# Patient Record
Sex: MALE | Race: BLACK OR AFRICAN AMERICAN | NOT HISPANIC OR LATINO | URBAN - METROPOLITAN AREA
[De-identification: names, ages, dates, MRNs, and addresses within clinical notes are randomized per-mention and may not be internally consistent; named-entity substitution may affect disease eponyms.]

---

## 2017-09-10 ENCOUNTER — EMERGENCY (EMERGENCY)
Facility: HOSPITAL | Age: 37
LOS: 1 days | Discharge: HOME | End: 2017-09-10
Attending: EMERGENCY MEDICINE
Payer: COMMERCIAL

## 2017-09-10 VITALS
TEMPERATURE: 98 F | SYSTOLIC BLOOD PRESSURE: 143 MMHG | RESPIRATION RATE: 18 BRPM | WEIGHT: 130.07 LBS | OXYGEN SATURATION: 100 % | HEIGHT: 67 IN | HEART RATE: 82 BPM | DIASTOLIC BLOOD PRESSURE: 83 MMHG

## 2017-09-10 PROCEDURE — 99284 EMERGENCY DEPT VISIT MOD MDM: CPT

## 2017-09-10 PROCEDURE — 72125 CT NECK SPINE W/O DYE: CPT

## 2017-09-10 PROCEDURE — 99284 EMERGENCY DEPT VISIT MOD MDM: CPT | Mod: 25

## 2017-09-10 PROCEDURE — 72110 X-RAY EXAM L-2 SPINE 4/>VWS: CPT | Mod: 26

## 2017-09-10 PROCEDURE — 72110 X-RAY EXAM L-2 SPINE 4/>VWS: CPT

## 2017-09-10 PROCEDURE — 72125 CT NECK SPINE W/O DYE: CPT | Mod: 26

## 2017-09-10 RX ORDER — IBUPROFEN 200 MG
600 TABLET ORAL ONCE
Qty: 0 | Refills: 0 | Status: COMPLETED | OUTPATIENT
Start: 2017-09-10 | End: 2017-09-10

## 2017-09-10 RX ORDER — IBUPROFEN 200 MG
1 TABLET ORAL
Qty: 28 | Refills: 0 | OUTPATIENT
Start: 2017-09-10 | End: 2017-09-17

## 2017-09-10 RX ADMIN — Medication 600 MILLIGRAM(S): at 16:12

## 2017-09-10 RX ADMIN — Medication 600 MILLIGRAM(S): at 15:35
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
## 2017-09-10 NOTE — ED STATDOCS - MUSCULOSKELETAL, MLM
Lower back TTP. Mild tenderness over the cervical spine. FROM. No bony deformity. Able to stand and ambulate.
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
## 2018-03-15 ENCOUNTER — APPOINTMENT (OUTPATIENT)
Dept: UROLOGY | Facility: CLINIC | Age: 38
End: 2018-03-15
Payer: MEDICAID

## 2018-03-15 VITALS
BODY MASS INDEX: 19.7 KG/M2 | DIASTOLIC BLOOD PRESSURE: 71 MMHG | HEIGHT: 68 IN | OXYGEN SATURATION: 100 % | SYSTOLIC BLOOD PRESSURE: 112 MMHG | TEMPERATURE: 98.1 F | HEART RATE: 69 BPM | WEIGHT: 130 LBS

## 2018-03-15 DIAGNOSIS — R33.9 RETENTION OF URINE, UNSPECIFIED: ICD-10-CM

## 2018-03-15 LAB
BILIRUB UR QL STRIP: NORMAL
CLARITY UR: CLEAR
COLLECTION METHOD: NORMAL
GLUCOSE UR-MCNC: NORMAL
HCG UR QL: 0.2 EU/DL
HGB UR QL STRIP.AUTO: NORMAL
KETONES UR-MCNC: NORMAL
LEUKOCYTE ESTERASE UR QL STRIP: NORMAL
NITRITE UR QL STRIP: NORMAL
PH UR STRIP: 6.5
PROT UR STRIP-MCNC: NORMAL
SP GR UR STRIP: 1.02

## 2018-03-15 PROCEDURE — 99204 OFFICE O/P NEW MOD 45 MIN: CPT

## 2018-03-15 PROCEDURE — 51798 US URINE CAPACITY MEASURE: CPT

## 2018-03-15 PROCEDURE — 81003 URINALYSIS AUTO W/O SCOPE: CPT | Mod: QW

## 2018-03-16 LAB
APPEARANCE: CLEAR
BACTERIA: NEGATIVE
BILIRUBIN URINE: NEGATIVE
BLOOD URINE: NEGATIVE
COLOR: YELLOW
GLUCOSE QUALITATIVE U: NEGATIVE MG/DL
KETONES URINE: NEGATIVE
LEUKOCYTE ESTERASE URINE: NEGATIVE
MICROSCOPIC-UA: NORMAL
NITRITE URINE: NEGATIVE
PH URINE: 6
PROTEIN URINE: NEGATIVE MG/DL
RED BLOOD CELLS URINE: 4 /HPF
SPECIFIC GRAVITY URINE: 1.02
SQUAMOUS EPITHELIAL CELLS: 0 /HPF
UROBILINOGEN URINE: NEGATIVE MG/DL
WHITE BLOOD CELLS URINE: 0 /HPF

## 2018-03-19 LAB
BACTERIA UR CULT: NORMAL
CORE LAB FLUID CYTOLOGY: NORMAL

## 2018-03-20 ENCOUNTER — MOBILE ON CALL (OUTPATIENT)
Age: 38
End: 2018-03-20

## 2018-04-19 ENCOUNTER — OUTPATIENT (OUTPATIENT)
Dept: OUTPATIENT SERVICES | Facility: HOSPITAL | Age: 38
LOS: 1 days | Discharge: HOME | End: 2018-04-19
Payer: MEDICAID

## 2018-04-19 VITALS
HEIGHT: 68 IN | RESPIRATION RATE: 16 BRPM | SYSTOLIC BLOOD PRESSURE: 121 MMHG | TEMPERATURE: 98 F | DIASTOLIC BLOOD PRESSURE: 89 MMHG | WEIGHT: 117.29 LBS | HEART RATE: 82 BPM | OXYGEN SATURATION: 100 %

## 2018-04-19 DIAGNOSIS — N47.1 PHIMOSIS: ICD-10-CM

## 2018-04-19 DIAGNOSIS — R33.9 RETENTION OF URINE, UNSPECIFIED: ICD-10-CM

## 2018-04-19 DIAGNOSIS — Z98.890 OTHER SPECIFIED POSTPROCEDURAL STATES: Chronic | ICD-10-CM

## 2018-04-19 LAB
ANION GAP SERPL CALC-SCNC: 6 MMOL/L — SIGNIFICANT CHANGE UP (ref 5–17)
APPEARANCE UR: CLEAR — SIGNIFICANT CHANGE UP
APTT BLD: 35.8 SEC — SIGNIFICANT CHANGE UP (ref 27.5–37.4)
BACTERIA # UR AUTO: (no result)
BASOPHILS # BLD AUTO: 0.05 K/UL — SIGNIFICANT CHANGE UP (ref 0–0.2)
BASOPHILS NFR BLD AUTO: 0.6 % — SIGNIFICANT CHANGE UP (ref 0–2)
BILIRUB UR-MCNC: NEGATIVE — SIGNIFICANT CHANGE UP
BLD GP AB SCN SERPL QL: SIGNIFICANT CHANGE UP
BUN SERPL-MCNC: 9 MG/DL — SIGNIFICANT CHANGE UP (ref 7–23)
CALCIUM SERPL-MCNC: 9.6 MG/DL — SIGNIFICANT CHANGE UP (ref 8.5–10.1)
CHLORIDE SERPL-SCNC: 105 MMOL/L — SIGNIFICANT CHANGE UP (ref 96–108)
CO2 SERPL-SCNC: 31 MMOL/L — SIGNIFICANT CHANGE UP (ref 22–31)
COLOR SPEC: YELLOW — SIGNIFICANT CHANGE UP
COMMENT - URINE: SIGNIFICANT CHANGE UP
CREAT SERPL-MCNC: 1.19 MG/DL — SIGNIFICANT CHANGE UP (ref 0.5–1.3)
DIFF PNL FLD: NEGATIVE — SIGNIFICANT CHANGE UP
EOSINOPHIL # BLD AUTO: 0.07 K/UL — SIGNIFICANT CHANGE UP (ref 0–0.5)
EOSINOPHIL NFR BLD AUTO: 0.8 % — SIGNIFICANT CHANGE UP (ref 0–6)
EPI CELLS # UR: SIGNIFICANT CHANGE UP
GLUCOSE SERPL-MCNC: 84 MG/DL — SIGNIFICANT CHANGE UP (ref 70–99)
GLUCOSE UR QL: NEGATIVE MG/DL — SIGNIFICANT CHANGE UP
HCT VFR BLD CALC: 42.3 % — SIGNIFICANT CHANGE UP (ref 39–50)
HGB BLD-MCNC: 14.3 G/DL — SIGNIFICANT CHANGE UP (ref 13–17)
IMM GRANULOCYTES NFR BLD AUTO: 0.3 % — SIGNIFICANT CHANGE UP (ref 0–1.5)
INR BLD: 1.08 RATIO — SIGNIFICANT CHANGE UP (ref 0.88–1.16)
KETONES UR-MCNC: NEGATIVE — SIGNIFICANT CHANGE UP
LEUKOCYTE ESTERASE UR-ACNC: (no result)
LYMPHOCYTES # BLD AUTO: 2.99 K/UL — SIGNIFICANT CHANGE UP (ref 1–3.3)
LYMPHOCYTES # BLD AUTO: 34.4 % — SIGNIFICANT CHANGE UP (ref 13–44)
MCHC RBC-ENTMCNC: 32.6 PG — SIGNIFICANT CHANGE UP (ref 27–34)
MCHC RBC-ENTMCNC: 33.8 GM/DL — SIGNIFICANT CHANGE UP (ref 32–36)
MCV RBC AUTO: 96.4 FL — SIGNIFICANT CHANGE UP (ref 80–100)
MONOCYTES # BLD AUTO: 0.64 K/UL — SIGNIFICANT CHANGE UP (ref 0–0.9)
MONOCYTES NFR BLD AUTO: 7.4 % — SIGNIFICANT CHANGE UP (ref 2–14)
NEUTROPHILS # BLD AUTO: 4.92 K/UL — SIGNIFICANT CHANGE UP (ref 1.8–7.4)
NEUTROPHILS NFR BLD AUTO: 56.5 % — SIGNIFICANT CHANGE UP (ref 43–77)
NITRITE UR-MCNC: NEGATIVE — SIGNIFICANT CHANGE UP
NRBC # BLD: 0 /100 WBCS — SIGNIFICANT CHANGE UP (ref 0–0)
PH UR: 6.5 — SIGNIFICANT CHANGE UP (ref 5–8)
PLATELET # BLD AUTO: 289 K/UL — SIGNIFICANT CHANGE UP (ref 150–400)
POTASSIUM SERPL-MCNC: 3.7 MMOL/L — SIGNIFICANT CHANGE UP (ref 3.5–5.3)
POTASSIUM SERPL-SCNC: 3.7 MMOL/L — SIGNIFICANT CHANGE UP (ref 3.5–5.3)
PROT UR-MCNC: 15 MG/DL
PROTHROM AB SERPL-ACNC: 11.7 SEC — SIGNIFICANT CHANGE UP (ref 9.8–12.7)
RBC # BLD: 4.39 M/UL — SIGNIFICANT CHANGE UP (ref 4.2–5.8)
RBC # FLD: 11.8 % — SIGNIFICANT CHANGE UP (ref 10.3–14.5)
RBC CASTS # UR COMP ASSIST: SIGNIFICANT CHANGE UP /HPF (ref 0–4)
SODIUM SERPL-SCNC: 142 MMOL/L — SIGNIFICANT CHANGE UP (ref 135–145)
SP GR SPEC: 1.02 — SIGNIFICANT CHANGE UP (ref 1.01–1.02)
TYPE + AB SCN PNL BLD: SIGNIFICANT CHANGE UP
UROBILINOGEN FLD QL: 1 MG/DL
WBC # BLD: 8.7 K/UL — SIGNIFICANT CHANGE UP (ref 3.8–10.5)
WBC # FLD AUTO: 8.7 K/UL — SIGNIFICANT CHANGE UP (ref 3.8–10.5)
WBC UR QL: SIGNIFICANT CHANGE UP

## 2018-04-19 PROCEDURE — 93010 ELECTROCARDIOGRAM REPORT: CPT

## 2018-04-19 NOTE — H&P PST ADULT - HISTORY OF PRESENT ILLNESS
This is a 35 y/o male with hx of asthma who reports increased frequency and urgency that has gotten progressively worse. He deny any recent UTIs or fevers or CVA tenderness. He is scheduled for  a cursions and cystoscopy. This is a 35 y/o male with hx of asthma who reports increased frequency and urgency that has gotten progressively worse. He deny any recent UTIs, fevers, hematuria or CVA tenderness. He is scheduled for a circumcision and cystoscopy.

## 2018-04-19 NOTE — H&P PST ADULT - ASSESSMENT
This is a 35 y/o male with a diagnosis of phimosis who is scheduled for a circumcision and cystoscopy    Patient instructed on     1. NPO post midnight of surgery  2. Aware that he needs medical clearance with Dr. Cummings  3. Instructed to use his nebulizer and that he may take Loratadine with a sip of water on morning of procedure

## 2018-04-20 LAB
CULTURE RESULTS: NO GROWTH — SIGNIFICANT CHANGE UP
SPECIMEN SOURCE: SIGNIFICANT CHANGE UP

## 2018-04-23 RX ORDER — SODIUM CHLORIDE 9 MG/ML
1000 INJECTION, SOLUTION INTRAVENOUS
Qty: 0 | Refills: 0 | Status: DISCONTINUED | OUTPATIENT
Start: 2018-04-24 | End: 2018-04-24

## 2018-04-23 RX ORDER — OXYCODONE HYDROCHLORIDE 5 MG/1
10 TABLET ORAL EVERY 6 HOURS
Qty: 0 | Refills: 0 | Status: DISCONTINUED | OUTPATIENT
Start: 2018-04-24 | End: 2018-04-24

## 2018-04-23 RX ORDER — FENTANYL CITRATE 50 UG/ML
50 INJECTION INTRAVENOUS
Qty: 0 | Refills: 0 | Status: DISCONTINUED | OUTPATIENT
Start: 2018-04-24 | End: 2018-04-24

## 2018-04-24 ENCOUNTER — RESULT REVIEW (OUTPATIENT)
Age: 38
End: 2018-04-24

## 2018-04-24 ENCOUNTER — OUTPATIENT (OUTPATIENT)
Dept: OUTPATIENT SERVICES | Facility: HOSPITAL | Age: 38
LOS: 1 days | Discharge: HOME | End: 2018-04-24
Payer: MEDICAID

## 2018-04-24 ENCOUNTER — APPOINTMENT (OUTPATIENT)
Dept: UROLOGY | Facility: HOSPITAL | Age: 38
End: 2018-04-24

## 2018-04-24 VITALS
OXYGEN SATURATION: 100 % | SYSTOLIC BLOOD PRESSURE: 141 MMHG | TEMPERATURE: 98 F | DIASTOLIC BLOOD PRESSURE: 85 MMHG | HEART RATE: 82 BPM | RESPIRATION RATE: 16 BRPM

## 2018-04-24 VITALS
WEIGHT: 117.95 LBS | HEART RATE: 81 BPM | RESPIRATION RATE: 16 BRPM | SYSTOLIC BLOOD PRESSURE: 135 MMHG | DIASTOLIC BLOOD PRESSURE: 89 MMHG | OXYGEN SATURATION: 99 % | TEMPERATURE: 98 F | HEIGHT: 68 IN

## 2018-04-24 DIAGNOSIS — Z98.890 OTHER SPECIFIED POSTPROCEDURAL STATES: Chronic | ICD-10-CM

## 2018-04-24 PROCEDURE — 88304 TISSUE EXAM BY PATHOLOGIST: CPT | Mod: 26

## 2018-04-24 PROCEDURE — 52000 CYSTOURETHROSCOPY: CPT

## 2018-04-24 RX ORDER — MORPHINE SULFATE 50 MG/1
4 CAPSULE, EXTENDED RELEASE ORAL EVERY 4 HOURS
Qty: 0 | Refills: 0 | Status: DISCONTINUED | OUTPATIENT
Start: 2018-04-24 | End: 2018-04-24

## 2018-04-24 RX ORDER — HYDROCODONE BITARTRATE AND ACETAMINOPHEN 7.5; 325 MG/15ML; MG/15ML
1 SOLUTION ORAL
Qty: 15 | Refills: 0
Start: 2018-04-24 | End: 2018-04-28

## 2018-04-24 RX ORDER — ONDANSETRON 8 MG/1
4 TABLET, FILM COATED ORAL ONCE
Qty: 0 | Refills: 0 | Status: DISCONTINUED | OUTPATIENT
Start: 2018-04-24 | End: 2018-04-24

## 2018-04-24 RX ADMIN — OXYCODONE HYDROCHLORIDE 10 MILLIGRAM(S): 5 TABLET ORAL at 13:12

## 2018-04-24 RX ADMIN — SODIUM CHLORIDE 75 MILLILITER(S): 9 INJECTION, SOLUTION INTRAVENOUS at 12:58

## 2018-04-24 RX ADMIN — OXYCODONE HYDROCHLORIDE 10 MILLIGRAM(S): 5 TABLET ORAL at 13:26

## 2018-04-24 RX ADMIN — FENTANYL CITRATE 50 MICROGRAM(S): 50 INJECTION INTRAVENOUS at 13:08

## 2018-04-24 RX ADMIN — FENTANYL CITRATE 50 MICROGRAM(S): 50 INJECTION INTRAVENOUS at 13:01

## 2018-04-24 NOTE — ASU DISCHARGE PLAN (ADULT/PEDIATRIC). - MEDICATION SUMMARY - MEDICATIONS TO TAKE
I will START or STAY ON the medications listed below when I get home from the hospital:    Vicodin 5 mg-300 mg oral tablet  -- 1 tab(s) by mouth every 6 hours MDD:4 tabs  -- Caution federal law prohibits the transfer of this drug to any person other  than the person for whom it was prescribed.  Do not drink alcoholic beverages when taking this medication.  May cause drowsiness.  Alcohol may intensify this effect.  Use care when operating dangerous machinery.  This drug may impair the ability to drive or operate machinery.  Use care until you become familiar with its effects.  This product contains acetaminophen.  Do not use  with any other product containing acetaminophen to prevent possible liver damage.  Using more of this medication than prescribed may cause serious breathing problems.    -- Indication: For s/p Circumcision    loratadine 10 mg oral tablet  -- 1 tab(s) by mouth once a day  -- Indication: For Home med    ipratropium-albuterol 0.5 mg-2.5 mg/3 mLinhalation solution  -- 3 milliliter(s) inhaled once a day  -- Indication: For Home med    Ventolin HFA 90 mcg/inh inhalation aerosol  -- 1 puff(s) inhaled   -- For inhalation only.  It is very important that you take or use this exactly as directed.  Do not skip doses or discontinue unless directed by your doctor.  Obtain medical advice before taking any non-prescription drugs as some may affect the action of this medication.  Shake well before use.      -- Indication: For Home med    Singulair 10 mg oral tablet  -- 1 tab(s) by mouth once a day (in the evening)  -- Indication: For Home med    fluticasone 50 mcg/inh nasal spray  -- 1 spray(s) into nose once a day  -- Indication: For Home med    Multiple Vitamins oral tablet  -- 1 tab(s) by mouth once a day  -- Indication: For Home med

## 2018-04-24 NOTE — BRIEF OPERATIVE NOTE - PROCEDURE
<<-----Click on this checkbox to enter Procedure Circumcision male  04/24/2018    Active  HAYLEY  Cystoscopy in adult  04/24/2018    Active  HAYLEY

## 2018-04-26 LAB — SURGICAL PATHOLOGY FINAL REPORT - CH: SIGNIFICANT CHANGE UP

## 2018-04-27 DIAGNOSIS — R31.21 ASYMPTOMATIC MICROSCOPIC HEMATURIA: ICD-10-CM

## 2018-04-27 DIAGNOSIS — Z82.49 FAMILY HISTORY OF ISCHEMIC HEART DISEASE AND OTHER DISEASES OF THE CIRCULATORY SYSTEM: ICD-10-CM

## 2018-04-27 DIAGNOSIS — F17.210 NICOTINE DEPENDENCE, CIGARETTES, UNCOMPLICATED: ICD-10-CM

## 2018-04-27 DIAGNOSIS — F12.29 CANNABIS DEPENDENCE WITH UNSPECIFIED CANNABIS-INDUCED DISORDER: ICD-10-CM

## 2018-04-27 DIAGNOSIS — N47.1 PHIMOSIS: ICD-10-CM

## 2018-04-27 DIAGNOSIS — G89.29 OTHER CHRONIC PAIN: ICD-10-CM

## 2018-04-27 DIAGNOSIS — J45.909 UNSPECIFIED ASTHMA, UNCOMPLICATED: ICD-10-CM

## 2018-04-27 DIAGNOSIS — M54.9 DORSALGIA, UNSPECIFIED: ICD-10-CM

## 2018-05-10 ENCOUNTER — APPOINTMENT (OUTPATIENT)
Dept: UROLOGY | Facility: CLINIC | Age: 38
End: 2018-05-10
Payer: MEDICAID

## 2018-05-10 VITALS
DIASTOLIC BLOOD PRESSURE: 81 MMHG | HEART RATE: 66 BPM | TEMPERATURE: 98.5 F | SYSTOLIC BLOOD PRESSURE: 115 MMHG | BODY MASS INDEX: 19.7 KG/M2 | WEIGHT: 130 LBS | HEIGHT: 68 IN

## 2018-05-10 DIAGNOSIS — N47.1 PHIMOSIS: ICD-10-CM

## 2018-05-10 DIAGNOSIS — R31.29 OTHER MICROSCOPIC HEMATURIA: ICD-10-CM

## 2018-05-10 PROCEDURE — 99213 OFFICE O/P EST LOW 20 MIN: CPT

## 2018-05-11 ENCOUNTER — MESSAGE (OUTPATIENT)
Age: 38
End: 2018-05-11

## 2018-05-14 ENCOUNTER — APPOINTMENT (OUTPATIENT)
Dept: UROLOGY | Facility: CLINIC | Age: 38
End: 2018-05-14

## 2019-06-09 PROBLEM — N47.1 PHIMOSIS: Status: ACTIVE | Noted: 2018-03-15

## 2021-05-09 NOTE — ASU DISCHARGE PLAN (ADULT/PEDIATRIC). - ACCOMPANYING ADULT'S SIGNATURE_______________________________________
CONST: no fevers, no chills, no trauma  EYES: no pain, no blurry vision   ENT: no sore throat, no epistaxis, no rhinorrhea  CV: + chest pain, no palpitations, no orthopnea, no extremity pain or swelling  RESP: + shortness of breath, + cough, no sputum, + pleurisy, no wheezing  ABD: no abdominal pain, no nausea, no vomiting, no diarrhea, no black or bloody stool  : no dysuria, no hematuria, no frequency, no urgency  MSK: no back pain, no neck pain, no extremity pain  NEURO: no headache, no sensory disturbances, no focal weakness, no dizziness  HEME: no easy bleeding or bruising  SKIN: no diaphoresis, no rash Statement Selected

## 2021-09-24 PROBLEM — N47.8 OTHER DISORDERS OF PREPUCE: Chronic | Status: ACTIVE | Noted: 2018-04-19

## 2021-09-24 PROBLEM — R35.0 FREQUENCY OF MICTURITION: Chronic | Status: ACTIVE | Noted: 2018-04-19

## 2021-09-24 PROBLEM — R39.15 URGENCY OF URINATION: Chronic | Status: ACTIVE | Noted: 2018-04-19

## 2021-09-24 PROBLEM — G43.909 MIGRAINE, UNSPECIFIED, NOT INTRACTABLE, WITHOUT STATUS MIGRAINOSUS: Chronic | Status: ACTIVE | Noted: 2018-04-19

## 2021-11-23 ENCOUNTER — APPOINTMENT (OUTPATIENT)
Dept: NEUROSURGERY | Facility: CLINIC | Age: 41
End: 2021-11-23
Payer: MEDICAID

## 2021-11-23 VITALS
HEIGHT: 68 IN | DIASTOLIC BLOOD PRESSURE: 81 MMHG | WEIGHT: 126 LBS | BODY MASS INDEX: 19.1 KG/M2 | TEMPERATURE: 96.8 F | SYSTOLIC BLOOD PRESSURE: 147 MMHG | HEART RATE: 70 BPM | OXYGEN SATURATION: 100 %

## 2021-11-23 PROCEDURE — 99203 OFFICE O/P NEW LOW 30 MIN: CPT
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
## 2022-03-02 ENCOUNTER — RESULT REVIEW (OUTPATIENT)
Age: 42
End: 2022-03-02

## 2022-03-02 ENCOUNTER — NON-APPOINTMENT (OUTPATIENT)
Age: 42
End: 2022-03-02

## 2022-03-02 ENCOUNTER — APPOINTMENT (OUTPATIENT)
Dept: RHEUMATOLOGY | Facility: CLINIC | Age: 42
End: 2022-03-02
Payer: MEDICAID

## 2022-03-02 VITALS
WEIGHT: 130 LBS | OXYGEN SATURATION: 98 % | BODY MASS INDEX: 19.7 KG/M2 | SYSTOLIC BLOOD PRESSURE: 120 MMHG | TEMPERATURE: 97.7 F | DIASTOLIC BLOOD PRESSURE: 70 MMHG | HEART RATE: 74 BPM | HEIGHT: 68 IN

## 2022-03-02 DIAGNOSIS — M54.50 LOW BACK PAIN, UNSPECIFIED: ICD-10-CM

## 2022-03-02 DIAGNOSIS — M25.512 PAIN IN LEFT SHOULDER: ICD-10-CM

## 2022-03-02 DIAGNOSIS — M25.569 PAIN IN UNSPECIFIED KNEE: ICD-10-CM

## 2022-03-02 DIAGNOSIS — M79.675 PAIN IN RIGHT TOE(S): ICD-10-CM

## 2022-03-02 DIAGNOSIS — Z87.09 PERSONAL HISTORY OF OTHER DISEASES OF THE RESPIRATORY SYSTEM: ICD-10-CM

## 2022-03-02 DIAGNOSIS — R20.0 ANESTHESIA OF SKIN: ICD-10-CM

## 2022-03-02 DIAGNOSIS — R20.2 ANESTHESIA OF SKIN: ICD-10-CM

## 2022-03-02 DIAGNOSIS — Z78.9 OTHER SPECIFIED HEALTH STATUS: ICD-10-CM

## 2022-03-02 DIAGNOSIS — M79.674 PAIN IN RIGHT TOE(S): ICD-10-CM

## 2022-03-02 DIAGNOSIS — Z82.49 FAMILY HISTORY OF ISCHEMIC HEART DISEASE AND OTHER DISEASES OF THE CIRCULATORY SYSTEM: ICD-10-CM

## 2022-03-02 DIAGNOSIS — M25.50 PAIN IN UNSPECIFIED JOINT: ICD-10-CM

## 2022-03-02 DIAGNOSIS — M25.579 PAIN IN UNSPECIFIED ANKLE AND JOINTS OF UNSPECIFIED FOOT: ICD-10-CM

## 2022-03-02 DIAGNOSIS — M25.531 PAIN IN RIGHT WRIST: ICD-10-CM

## 2022-03-02 DIAGNOSIS — M19.049 PRIMARY OSTEOARTHRITIS, UNSPECIFIED HAND: ICD-10-CM

## 2022-03-02 PROCEDURE — 99205 OFFICE O/P NEW HI 60 MIN: CPT

## 2022-03-02 RX ORDER — GABAPENTIN 600 MG/1
600 TABLET, COATED ORAL
Refills: 0 | Status: ACTIVE | COMMUNITY

## 2022-03-02 NOTE — HISTORY OF PRESENT ILLNESS
[FreeTextEntry1] : 41-year-old AA male, here for the first time w/ hx of anxiety/depression; chronic pain syndrome w/ pain management, Dr. Sawant reports of joint aches and achy pain all over.\par Patient states he's been noticing diffuse pain all over including joint aches for the past 2-3 years.\par Patient states he notices some pain at the base of his right thumb.\par States he notices some soreness in his right wrist without effusion.\par States he notices some soreness in his bilateral elbows intermittently.\par States he notices soreness in his left shoulder with rotation with good range of motion.\par Has full ROM in b/l shoulders, no pelvic/girdle stiffness and able to stand up without using her hands, no temporal pain/unremitting headaches, no vision changes, no jaw pain.\par States he notices neck pain with rotation without paresthesia and does get nerve block as well as cortisone injections to his neck and back with pain management.\par States he is on gabapentin 600 t.i.d. 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 Denies any loss of bladder or bowel incontinence or saddle anesthesias.\par States he notes left more than right groin/hip pain. \par States he notes left more than right knee pain with going up and down the stairs and denies any crystal arthropathy like attacks.\par States he notes his left more than right ankle pain.\par States he noticed soreness in his bilateral toes.\par States he notices intermittent numbness/tingling in his hands and feet. States he thinks the numbness/tingling goes into the fifth digit of the hand so unlikely carpal tunnel.\par Denies any fever/chills, no rashes, no ulcers, no dry eyes, no dry mouth, no raynaud's, no infectious diarrhea or  symptoms at this time.\par \par
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
## 2022-03-02 NOTE — PHYSICAL EXAM
[General Appearance - Alert] : alert [General Appearance - In No Acute Distress] : in no acute distress [Sclera] : the sclera and conjunctiva were normal [Extraocular Movements] : extraocular movements were intact [Outer Ear] : the ears and nose were normal in appearance [Neck Appearance] : the appearance of the neck was normal [Respiration, Rhythm And Depth] : normal respiratory rhythm and effort [Heart Rate And Rhythm] : heart rate was normal and rhythm regular [Heart Sounds] : normal S1 and S2 [Abdomen Soft] : soft [Abdomen Tenderness] : non-tender [Cervical Lymph Nodes Enlarged Anterior Bilaterally] : anterior cervical [Supraclavicular Lymph Nodes Enlarged Bilaterally] : supraclavicular [No CVA Tenderness] : no ~M costovertebral angle tenderness [Motor Tone] : muscle strength and tone were normal [] : no rash [No Focal Deficits] : no focal deficits [Impaired Insight] : insight and judgment were intact [Mood] : the mood was normal [FreeTextEntry1] : Rt CMC tenderness; Rt wrist tenderness w/o effusion; no synovitis or effusion on exam noted today; good ROM in b/l shoulders w/ Lt shoulder tenderness on rotation; Lt>Rt hip/groin tenderness; + multiple tender points incld sternum; upper/lower back;elbows;hips; medial knee,etc

## 2022-03-11 ENCOUNTER — LABORATORY RESULT (OUTPATIENT)
Age: 42
End: 2022-03-11

## 2022-03-12 LAB
25(OH)D3 SERPL-MCNC: 26 NG/ML
ALBUMIN SERPL ELPH-MCNC: 5 G/DL
ALP BLD-CCNC: 67 U/L
ALT SERPL-CCNC: 21 U/L
ANION GAP SERPL CALC-SCNC: 14 MMOL/L
AST SERPL-CCNC: 24 U/L
BASOPHILS # BLD AUTO: 0.03 K/UL
BASOPHILS NFR BLD AUTO: 0.5 %
BILIRUB SERPL-MCNC: 0.5 MG/DL
BUN SERPL-MCNC: 12 MG/DL
CALCIUM SERPL-MCNC: 9.6 MG/DL
CHLORIDE SERPL-SCNC: 102 MMOL/L
CK SERPL-CCNC: 153 U/L
CO2 SERPL-SCNC: 25 MMOL/L
CREAT SERPL-MCNC: 1.3 MG/DL
CRP SERPL-MCNC: <3 MG/L
EGFR: 70 ML/MIN/1.73M2
EOSINOPHIL # BLD AUTO: 0.08 K/UL
EOSINOPHIL NFR BLD AUTO: 1.2 %
ERYTHROCYTE [SEDIMENTATION RATE] IN BLOOD BY WESTERGREN METHOD: 5 MM/HR
ESTIMATED AVERAGE GLUCOSE: 97 MG/DL
FOLATE SERPL-MCNC: >20 NG/ML
GLUCOSE SERPL-MCNC: 78 MG/DL
HAV IGM SER QL: NONREACTIVE
HBA1C MFR BLD HPLC: 5 %
HBV CORE IGM SER QL: NONREACTIVE
HBV SURFACE AG SER QL: NONREACTIVE
HCT VFR BLD CALC: 45.2 %
HCV AB SER QL: NONREACTIVE
HCV S/CO RATIO: 0.08 S/CO
HGB BLD-MCNC: 14.4 G/DL
IMM GRANULOCYTES NFR BLD AUTO: 0.3 %
LYMPHOCYTES # BLD AUTO: 2.13 K/UL
LYMPHOCYTES NFR BLD AUTO: 32.7 %
MAN DIFF?: NORMAL
MCHC RBC-ENTMCNC: 31.2 PG
MCHC RBC-ENTMCNC: 31.9 GM/DL
MCV RBC AUTO: 97.8 FL
MONOCYTES # BLD AUTO: 0.47 K/UL
MONOCYTES NFR BLD AUTO: 7.2 %
NEUTROPHILS # BLD AUTO: 3.79 K/UL
NEUTROPHILS NFR BLD AUTO: 58.1 %
PLATELET # BLD AUTO: 298 K/UL
POTASSIUM SERPL-SCNC: 4.2 MMOL/L
PROT SERPL-MCNC: 7.4 G/DL
RBC # BLD: 4.62 M/UL
RBC # FLD: 11.8 %
RHEUMATOID FACT SER QL: <10 IU/ML
SODIUM SERPL-SCNC: 141 MMOL/L
TSH SERPL-ACNC: 0.73 UIU/ML
VIT B12 SERPL-MCNC: 1200 PG/ML
WBC # FLD AUTO: 6.52 K/UL

## 2022-03-14 LAB
ACE BLD-CCNC: 45 U/L
CCP AB SER IA-ACNC: <8 UNITS
ENA RNP AB SER IA-ACNC: 0.2 AL
ENA SM AB SER IA-ACNC: <0.2 AL
ENA SS-A AB SER IA-ACNC: <0.2 AL
ENA SS-B AB SER IA-ACNC: <0.2 AL
RF+CCP IGG SER-IMP: NEGATIVE

## 2022-03-16 LAB
ANA SER IF-ACNC: NEGATIVE
B19V IGG SER QL IA: 6.85 INDEX
B19V IGG+IGM SER-IMP: NORMAL
B19V IGG+IGM SER-IMP: POSITIVE
B19V IGM FLD-ACNC: 0.11 INDEX
B19V IGM SER-ACNC: NEGATIVE
G6PD SER-CCNC: 2.3 U/G HGB

## 2022-03-18 ENCOUNTER — APPOINTMENT (OUTPATIENT)
Dept: RADIOLOGY | Facility: CLINIC | Age: 42
End: 2022-03-18
Payer: MEDICAID

## 2022-03-18 ENCOUNTER — OUTPATIENT (OUTPATIENT)
Dept: OUTPATIENT SERVICES | Facility: HOSPITAL | Age: 42
LOS: 1 days | End: 2022-03-18
Payer: MEDICAID

## 2022-03-18 DIAGNOSIS — M25.531 PAIN IN RIGHT WRIST: ICD-10-CM

## 2022-03-18 DIAGNOSIS — Z98.890 OTHER SPECIFIED POSTPROCEDURAL STATES: Chronic | ICD-10-CM

## 2022-03-18 DIAGNOSIS — M19.049 PRIMARY OSTEOARTHRITIS, UNSPECIFIED HAND: ICD-10-CM

## 2022-03-18 DIAGNOSIS — M54.50 LOW BACK PAIN, UNSPECIFIED: ICD-10-CM

## 2022-03-18 DIAGNOSIS — M25.569 PAIN IN UNSPECIFIED KNEE: ICD-10-CM

## 2022-03-18 PROCEDURE — 72100 X-RAY EXAM L-S SPINE 2/3 VWS: CPT | Mod: 26

## 2022-03-18 PROCEDURE — 73100 X-RAY EXAM OF WRIST: CPT | Mod: 26,RT

## 2022-03-18 PROCEDURE — 73030 X-RAY EXAM OF SHOULDER: CPT | Mod: 26,LT

## 2022-03-18 PROCEDURE — 73120 X-RAY EXAM OF HAND: CPT

## 2022-03-18 PROCEDURE — 73120 X-RAY EXAM OF HAND: CPT | Mod: 26,RT

## 2022-03-18 PROCEDURE — 73521 X-RAY EXAM HIPS BI 2 VIEWS: CPT | Mod: 26

## 2022-03-18 PROCEDURE — 71046 X-RAY EXAM CHEST 2 VIEWS: CPT | Mod: 26

## 2022-03-18 PROCEDURE — 73565 X-RAY EXAM OF KNEES: CPT | Mod: 26

## 2022-03-18 PROCEDURE — 73660 X-RAY EXAM OF TOE(S): CPT | Mod: 26,50

## 2022-03-18 PROCEDURE — 73100 X-RAY EXAM OF WRIST: CPT

## 2022-03-18 PROCEDURE — 73521 X-RAY EXAM HIPS BI 2 VIEWS: CPT

## 2022-03-18 PROCEDURE — 72100 X-RAY EXAM L-S SPINE 2/3 VWS: CPT

## 2022-03-18 PROCEDURE — 71046 X-RAY EXAM CHEST 2 VIEWS: CPT

## 2022-03-18 PROCEDURE — 73565 X-RAY EXAM OF KNEES: CPT

## 2022-03-18 PROCEDURE — 73660 X-RAY EXAM OF TOE(S): CPT

## 2022-03-18 PROCEDURE — 73030 X-RAY EXAM OF SHOULDER: CPT

## 2022-03-21 LAB — HLA-B27 RELATED AG QL: NEGATIVE
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
## 2022-06-09 ENCOUNTER — APPOINTMENT (OUTPATIENT)
Dept: RHEUMATOLOGY | Facility: CLINIC | Age: 42
End: 2022-06-09
Payer: MEDICAID

## 2022-06-09 VITALS
WEIGHT: 130 LBS | OXYGEN SATURATION: 99 % | SYSTOLIC BLOOD PRESSURE: 110 MMHG | HEIGHT: 68 IN | BODY MASS INDEX: 19.7 KG/M2 | HEART RATE: 82 BPM | DIASTOLIC BLOOD PRESSURE: 70 MMHG | TEMPERATURE: 97.4 F

## 2022-06-09 DIAGNOSIS — M62.830 MUSCLE SPASM OF BACK: ICD-10-CM

## 2022-06-09 DIAGNOSIS — M79.644 PAIN IN RIGHT FINGER(S): ICD-10-CM

## 2022-06-09 DIAGNOSIS — R52 PAIN, UNSPECIFIED: ICD-10-CM

## 2022-06-09 DIAGNOSIS — E55.9 VITAMIN D DEFICIENCY, UNSPECIFIED: ICD-10-CM

## 2022-06-09 PROCEDURE — 99214 OFFICE O/P EST MOD 30 MIN: CPT | Mod: 25

## 2022-06-09 PROCEDURE — 20600 DRAIN/INJ JOINT/BURSA W/O US: CPT | Mod: RT

## 2022-06-09 RX ORDER — TRIAMCINOLONE ACETONIDE 40 MG/ML
40 SUSPENSION INTRA-ARTERIAL; INTRAMUSCULAR
Qty: 1 | Refills: 0 | Status: COMPLETED | OUTPATIENT
Start: 2022-06-09

## 2022-06-09 RX ADMIN — TRIAMCINOLONE ACETONIDE 0 MG/ML: 40 INJECTION, SUSPENSION INTRA-ARTICULAR; INTRAMUSCULAR at 00:00

## 2022-06-09 NOTE — PHYSICAL EXAM
[General Appearance - Alert] : alert [General Appearance - In No Acute Distress] : in no acute distress [Sclera] : the sclera and conjunctiva were normal [Extraocular Movements] : extraocular movements were intact [Outer Ear] : the ears and nose were normal in appearance [Neck Appearance] : the appearance of the neck was normal [Respiration, Rhythm And Depth] : normal respiratory rhythm and effort [Heart Rate And Rhythm] : heart rate was normal and rhythm regular [Heart Sounds] : normal S1 and S2 [Abdomen Soft] : soft [Abdomen Tenderness] : non-tender [Cervical Lymph Nodes Enlarged Anterior Bilaterally] : anterior cervical [Supraclavicular Lymph Nodes Enlarged Bilaterally] : supraclavicular [No CVA Tenderness] : no ~M costovertebral angle tenderness [Motor Tone] : muscle strength and tone were normal [] : no rash [No Focal Deficits] : no focal deficits [Impaired Insight] : insight and judgment were intact [Mood] : the mood was normal [FreeTextEntry1] : Rt CMC tenderness; No synovitis or effusion on exam noted today; good ROM in b/l shoulders; + multiple tender points incld sternum; upper/lower back;elbows;hips; medial knee,etc

## 2022-06-09 NOTE — HISTORY OF PRESENT ILLNESS
[FreeTextEntry1] : 41-year-old AA male, here for the first follow up w/ hx of anxiety/depression; chronic pain syndrome w/ pain management, Dr. Sawant reports of joint aches and achy pain all over.\par Today he states he did labs and xrays to review in detail. \par Patient states he's been noticing diffuse pain all over including joint aches for the past 2-3 years.\par Patient states he notices some pain at the base of his right thumb.\par States he notices some soreness in his right wrist without effusion.\par States he notices some soreness in his bilateral elbows intermittently.\par States he notices soreness in his left shoulder with rotation with good range of motion.\par Has full ROM in b/l shoulders, no pelvic/girdle stiffness and able to stand up without using her hands, no temporal pain/unremitting headaches, no vision changes, no jaw pain.\par States he notices neck pain with rotation without paresthesia and does get nerve block as well as cortisone injections to his neck and back with pain management.\par States he is on gabapentin 600 t.i.d. 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 Denies any loss of bladder or bowel incontinence or saddle anesthesias.\par States he notes left more than right groin/hip pain. \par States he notes left more than right knee pain with going up and down the stairs and denies any crystal arthropathy like attacks.\par States he notes his left more than right ankle pain.\par States he noticed soreness in his bilateral toes.\par States he notices intermittent numbness/tingling in his hands and feet. States he thinks the numbness/tingling goes into the fifth digit of the hand so unlikely carpal tunnel.\par Denies any fever/chills, no rashes, no ulcers, no dry eyes, no dry mouth, no raynaud's, no infectious diarrhea or  symptoms at this time.\par \par

## 2022-06-09 NOTE — ASSESSMENT
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; Fibromyalgia; and G6PD deficiency on labs.\par -No synovitis or effusion on exam noted today and advised to monitor.\par -reviewed labs 3/11/22 with normal ESR/CRP; serologies all within normal limits at this time\par -Referred xray LS spine 3/18/22 normal; SI normal\par -reviewed xray LT shoulder 3/18/22 slightly narrowed AC joint space\par -reviewed xray b/l feet 3/18/22 feet normal; no erosions\par -reviewed xray knees 3/18/22 normal\par -reviewed xray hips 3/18/22 normal\par -reviewed chest xray 3/18/22 without hilar adenopathy/sarcoid \par  \par Rt CMC pain: reviewed xray Rt hand/wrist 3/18/22 w/ 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\par -offered Kenalog 20 mg injection today w/ r/b/s discussed and patient agreeable.\par -discussed r/b/s of voltaren gel 1% PRN w/ pt agreeable and prescription sent as below \par \par G6PD deficiency:  low G6PD on labs 3/11/22\par -discussed to avoid foods incld galen beans, meds like HCQ or sulfasalazine w/ G6PD def to prevent hemolytic anemia with hand out given \par \par Cervicalgia: tenderness in c-spine w/ rotation w/ good ROM w/o paresthesias \par -Reviewed MRI 7/9/21 w/ disc herniations and disc bulge \par -upper paraspinal tightness/spams: -discussed r/b/s of cyclobenzaprine PRN w/ pt agreeable and prescription sent as below\par -states he gets steroid injection and nerve block to neck and back w/ pain management \par -on gabapentin 600 mg PO TID \par \par Intermittent numbness/tingling: intermittent in hands and feet. States tingling in b/l hands does not spare 5th digit so unlikely CTS.\par -reviewed metabolic labs 3/11/22 all normal incld folate, B12, TSH, HbA1C for it.\par -if persistent can consider EMG w/ Neuro\par \par Fibromyalgia: + tender points present \par -on gabapentin 600mg PO TID w/ pain management, Dr. Sawant\par -discussed adding cymbalta with patient agreeable and thinks perhaps had in the past and he does not recall results on it \par -discussed r/b/s of Cymbalta 20mg daily w/ pt agreeable and prescription sent as below\par -encouraged gentle exercises as tolerated\par -PT referral for medical massage given if covered by insurance \par \par Depression/Anxiety: denies any SI or HI and states he can note these symptoms \par -discussed r/b/s of Cymbalta 20mg daily w/ pt agreeable and prescription sent as below\par \par Chronic pain syndrome: on medical marijuana and Percocet w/ pain management \par \par -educated on symptoms to monitor for in detail and alert us if any concerns.\par -knows to stay up to date on health maintenance w/ PCP\par -f/u in 2-3 mo or sooner as needed \par \par

## 2022-06-09 NOTE — REASON FOR VISIT
[Follow-Up: _____] : a [unfilled] follow-up visit [FreeTextEntry1] : generalized aches/pain; review labs/xrays; Rt thumb pain

## 2022-06-09 NOTE — PROCEDURE
[Today's Date:] : Date: [unfilled] [Risks] : risks [Benefits] : benefits [Alternatives] : alternatives [Consent Obtained] : written consent was obtained prior to the procedure and is detailed in the patient's record [Patient] : Prior to the start of the procedure a time out was taken and the identity of the patient was confirmed via name and date of birth with the patient. The correct site and the procedure to be performed were confirmed. The correct side was confirmed if applicable. The availability of the correct equipment was verified [Therapeutic] : therapeutic [#1 Site: ______] : #1 site identified in the [unfilled] [Ethyl Chloride] : ethyl chloride [Betadine] : betadine solution [Alcohol] : alcohol [25 gauge 5/8  inch] : A 25 gauge 5/8 inch needle was used [___ml 1% Lidocaine] : [unfilled] ml of 1% lidocaine [Tolerated Well] : the patient tolerated the procedure well [No Complications] : there were no complications [de-identified] : Kenalog 20 mg

## 2022-09-14 ENCOUNTER — APPOINTMENT (OUTPATIENT)
Dept: RHEUMATOLOGY | Facility: CLINIC | Age: 42
End: 2022-09-14

## 2022-09-14 VITALS
DIASTOLIC BLOOD PRESSURE: 80 MMHG | TEMPERATURE: 97.4 F | HEIGHT: 68 IN | HEART RATE: 89 BPM | SYSTOLIC BLOOD PRESSURE: 120 MMHG | WEIGHT: 132 LBS | BODY MASS INDEX: 20 KG/M2 | OXYGEN SATURATION: 97 %

## 2022-09-14 DIAGNOSIS — Z23 ENCOUNTER FOR IMMUNIZATION: ICD-10-CM

## 2022-09-14 PROCEDURE — G0008: CPT

## 2022-09-14 PROCEDURE — 90686 IIV4 VACC NO PRSV 0.5 ML IM: CPT

## 2022-09-14 PROCEDURE — 99214 OFFICE O/P EST MOD 30 MIN: CPT | Mod: 25

## 2022-09-14 RX ORDER — ERGOCALCIFEROL 1.25 MG/1
1.25 MG CAPSULE, LIQUID FILLED ORAL
Qty: 4 | Refills: 1 | Status: DISCONTINUED | COMMUNITY
Start: 2022-06-09 | End: 2022-09-14

## 2022-09-14 NOTE — HISTORY OF PRESENT ILLNESS
[FreeTextEntry1] : 41-year-old AA male, here for follow up w/ hx of anxiety/depression; chronic pain syndrome w/ pain management, Dr. 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 Denies any SI or HI.\par States he needs refill on the cymbalta and defers needing to increasing the dose on it at this time.\par Denies any swelling or redness or warmth of any joints at this time.\par Has full ROM in b/l shoulders, no pelvic/girdle stiffness and able to stand up without using her hands, no temporal pain/unremitting headaches, no vision changes, no jaw pain.\par States he notices intermittent neck pain with rotation without paresthesia and does get nerve block as well as cortisone injections to his neck and back with pain management.\par States he is on gabapentin 600 t.i.d. with pain management.\par States he is a medical marijuana with pain management.\par States he notices intermittent numbness/tingling in his hands and feet. States he thinks the numbness/tingling goes into the fifth digit of the hand so unlikely carpal tunnel.\par He has hx of Rt CMC OA and states he would like to see hand specialist for it. \par Denies any fever/chills, no rashes, no ulcers, no dry eyes, no dry mouth, no raynaud's, no infectious diarrhea or  symptoms at this time.\par \par States he would like the flu shot today; denies any egg allergy and reports no reaction to it in the past.\par \par

## 2022-09-14 NOTE — PHYSICAL EXAM
[General Appearance - Alert] : alert [General Appearance - In No Acute Distress] : in no acute distress [Sclera] : the sclera and conjunctiva were normal [Extraocular Movements] : extraocular movements were intact [Outer Ear] : the ears and nose were normal in appearance [Neck Appearance] : the appearance of the neck was normal [Respiration, Rhythm And Depth] : normal respiratory rhythm and effort [Heart Rate And Rhythm] : heart rate was normal and rhythm regular [Heart Sounds] : normal S1 and S2 [Abdomen Soft] : soft [Abdomen Tenderness] : non-tender [Cervical Lymph Nodes Enlarged Anterior Bilaterally] : anterior cervical [Supraclavicular Lymph Nodes Enlarged Bilaterally] : supraclavicular [No CVA Tenderness] : no ~M costovertebral angle tenderness [Motor Tone] : muscle strength and tone were normal [] : no rash [No Focal Deficits] : no focal deficits [Impaired Insight] : insight and judgment were intact [Mood] : the mood was normal [FreeTextEntry1] : No synovitis or effusion on exam noted today; Good ROM in b/l shoulders, no pelvic/girdle stiffness and able to stand up without using his hands; improved tender points

## 2022-09-14 NOTE — ASSESSMENT
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; Fibromyalgia; and G6PD deficiency on labs.\par -No synovitis or effusion on exam noted today and advised to monitor.\par -reviewed labs 3/11/22 with normal ESR/CRP; serologies all within normal limits at this time\par -labs as below to monitor \par -xray LS spine 3/18/22 normal; SI normal\par -xray LT shoulder 3/18/22 slightly narrowed AC joint space\par -xray b/l feet 3/18/22 feet normal; no erosions\par -xray knees 3/18/22 normal\par -xray hips 3/18/22 normal\par -chest xray 3/18/22 without hilar adenopathy/sarcoid \par  \par Rt CMC pain: reviewed xray Rt hand/wrist 3/18/22 w/ 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\par -discussed r/b/s of voltaren gel 1% PRN w/ pt agreeable and prescription sent as below \par -referred to Ortho hand given as requested \par \par G6PD deficiency: low G6PD on labs 3/11/22\par -discussed to avoid foods incld galen beans, meds like HCQ or sulfasalazine w/ G6PD def to prevent hemolytic anemia with hand out given \par \par Cervicalgia: tenderness in c-spine w/ rotation w/ good ROM w/o paresthesias; intermittent not much today \par -Reviewed MRI 7/9/21 w/ disc herniations and disc bulge \par -upper paraspinal tightness/spams: cyclobenzaprine PRN \par -states he gets steroid injection and nerve block to neck and back w/ pain management \par -on gabapentin 600 mg PO TID \par \par Intermittent numbness/tingling: intermittent in hands and feet. States tingling in b/l hands does not spare 5th digit so unlikely CTS.\par -reviewed metabolic labs 3/11/22 all normal incld folate, B12, TSH, HbA1C for it.\par -if persistent can consider EMG w/ Neuro\par \par Fibromyalgia: + tender points improved \par -on gabapentin 600mg PO TID w/ pain management, Dr. Sawant\par -discussed r/b/s of refilling Cymbalta 20mg daily w/ pt agreeable and prescription sent as below; defers needing to increase dose at this time \par -encouraged gentle exercises as tolerated\par \par Depression/Anxiety: denies any SI or HI and states feels mood is improved on the cymbalta and does not need to increase it \par -discussed r/b/s of refilling Cymbalta 20mg daily w/ pt agreeable and prescription sent as below\par \par Chronic pain syndrome: on medical marijuana and Percocet w/ pain management \par \par Health Maintenance: Fluzone 0.5ml lot # [id number] exp 6/30/23 given to left deltoid today w/ r/b/s discussed and tolerated well.\par \par -educated on symptoms to monitor for in detail and alert us if any concerns.\par -knows to stay up to date on health maintenance w/ PCP\par -f/u in 3 mo or sooner as needed \par \par

## 2022-09-14 NOTE — REASON FOR VISIT
[Follow-Up: _____] : a [unfilled] follow-up visit [FreeTextEntry1] : Fibromyalgia; chronic pain; OA; labs/med; flu shot

## 2022-09-14 NOTE — PROCEDURE
[Today's Date:] : Date: [unfilled] [Risks] : risks [Benefits] : benefits [Alternatives] : alternatives [Consent Obtained] : written consent was obtained prior to the procedure and is detailed in the patient's record [Patient] : Prior to the start of the procedure a time out was taken and the identity of the patient was confirmed via name and date of birth with the patient. The correct site and the procedure to be performed were confirmed. The correct side was confirmed if applicable. The availability of the correct equipment was verified [Therapeutic] : therapeutic [#1 Site: ______] : #1 site identified in the [unfilled] [Alcohol] : alcohol [25 gauge 1 inch] : A 25 gauge 1 inch needle was used [Tolerated Well] : the patient tolerated the procedure well [No Complications] : there were no complications [de-identified] : Fluzone 0.5ml lot # [id number] exp 6/30/23

## 2022-09-27 ENCOUNTER — APPOINTMENT (OUTPATIENT)
Dept: ORTHOPEDIC SURGERY | Facility: CLINIC | Age: 42
End: 2022-09-27

## 2022-09-27 VITALS
DIASTOLIC BLOOD PRESSURE: 85 MMHG | HEART RATE: 80 BPM | HEIGHT: 68 IN | WEIGHT: 132 LBS | SYSTOLIC BLOOD PRESSURE: 132 MMHG | BODY MASS INDEX: 20 KG/M2

## 2022-09-27 DIAGNOSIS — M16.12 UNILATERAL PRIMARY OSTEOARTHRITIS, LEFT HIP: ICD-10-CM

## 2022-09-27 PROCEDURE — 72100 X-RAY EXAM L-S SPINE 2/3 VWS: CPT

## 2022-09-27 PROCEDURE — 99204 OFFICE O/P NEW MOD 45 MIN: CPT

## 2022-10-03 ENCOUNTER — OUTPATIENT (OUTPATIENT)
Dept: OUTPATIENT SERVICES | Facility: HOSPITAL | Age: 42
LOS: 1 days | End: 2022-10-03

## 2022-10-03 ENCOUNTER — APPOINTMENT (OUTPATIENT)
Dept: MRI IMAGING | Facility: CLINIC | Age: 42
End: 2022-10-03

## 2022-10-03 DIAGNOSIS — M47.816 SPONDYLOSIS WITHOUT MYELOPATHY OR RADICULOPATHY, LUMBAR REGION: ICD-10-CM

## 2022-10-03 DIAGNOSIS — Z98.890 OTHER SPECIFIED POSTPROCEDURAL STATES: Chronic | ICD-10-CM

## 2022-10-03 PROCEDURE — 72148 MRI LUMBAR SPINE W/O DYE: CPT | Mod: 26

## 2022-10-04 NOTE — H&P PST ADULT - FAMILY HISTORY
Discharged
Grandparent  Still living? No  Family history of pneumothorax, Age at diagnosis: Age Unknown     Mother  Still living? No  Family history of HF (heart failure), Age at diagnosis: Age Unknown

## 2022-10-14 ENCOUNTER — APPOINTMENT (OUTPATIENT)
Dept: MRI IMAGING | Facility: CLINIC | Age: 42
End: 2022-10-14

## 2022-10-14 ENCOUNTER — OUTPATIENT (OUTPATIENT)
Dept: OUTPATIENT SERVICES | Facility: HOSPITAL | Age: 42
LOS: 1 days | End: 2022-10-14

## 2022-10-14 DIAGNOSIS — M47.816 SPONDYLOSIS WITHOUT MYELOPATHY OR RADICULOPATHY, LUMBAR REGION: ICD-10-CM

## 2022-10-14 DIAGNOSIS — Z98.890 OTHER SPECIFIED POSTPROCEDURAL STATES: Chronic | ICD-10-CM

## 2022-10-14 PROCEDURE — 73721 MRI JNT OF LWR EXTRE W/O DYE: CPT | Mod: 26,LT

## 2022-11-10 ENCOUNTER — APPOINTMENT (OUTPATIENT)
Dept: ORTHOPEDIC SURGERY | Facility: CLINIC | Age: 42
End: 2022-11-10

## 2022-11-10 VITALS
WEIGHT: 132 LBS | SYSTOLIC BLOOD PRESSURE: 130 MMHG | HEIGHT: 68 IN | BODY MASS INDEX: 20 KG/M2 | DIASTOLIC BLOOD PRESSURE: 75 MMHG | HEART RATE: 92 BPM

## 2022-11-10 PROCEDURE — 99214 OFFICE O/P EST MOD 30 MIN: CPT

## 2022-11-10 NOTE — DISCUSSION/SUMMARY
[Medication Risks Reviewed] : Medication risks reviewed [de-identified] : Conservative treatment was discussed with the patient at length. Anticipatory guidance regarding disease process Lumbar pain, left groin pain, History of fibromyalgia avoidance of acute exacerbation this was discussed at length and all patients commenting concerns were answered to the patient's satisfaction. Physical therapy for decrease pain and increase function was Discussed the patient just came off within a week course of physical therapy which actually increased his pain.Intermittent use of acetaminophen 500 mg 2 tablets t.i.d. p.r.n. mild to moderate pain, ibuprofen 600 mg t.i.d. p.r.n. severe pain with food or milk if there is no medical contraindication. Home exercise including stretching on a daily basis for 20-30 minutes was recommended. Heat, ice, topical were discussed as needed. The patient will followup in After a lumbar MRI is completed, lumbar MRI as medically necessary for continued back and left lower extremity the pain and decreased strength in the left foot/foot drop to evaluate for possible need for surgical option. Left hip MRI is ordered as medically necessary for severe left groin and buttock pain on hip flexion and external rotation and will be used to properly refer the patient to lower extremity specialty is necessary Probably refer her possibly to have specialty her toes to [facility]. 2 followup with rheumatology regarding fibromyalgia which I think is uncontrolled at this time due to some Su signs on physical exam.

## 2022-11-10 NOTE — HISTORY OF PRESENT ILLNESS
[de-identified] : Patient presents for evaluation of back and left hip pain patient had MRIs presents in return patient states his lumbar spine is much improved he still has the same hip pain palpable clicking etc. [Stable] : stable [10] : a maximum pain level of 10/10 [NSAIDs] : relieved by nonsteroidal anti-inflammatory drugs [Physical Therapy] : relieved by physical therapy [Ataxia] : no ataxia [Incontinence] : no incontinence [Loss of Dexterity] : good dexterity [Urinary Ret.] : no urinary retention [de-identified] : Left hip range of motion

## 2022-11-10 NOTE — PHYSICAL EXAM
[de-identified] : Lumbar exam:\par CONSTITUTIONAL: The patient is a very pleasant individual who is well-nourished and who appears stated age.\par PSYCHIATRIC: The patient is alert and oriented X 3 and in no apparent distress, and participates with orthopedic evaluation well.\par HEAD: Atraumatic and is nonsyndromic in appearance.\par EENT: No visible thyromegaly, EOMI.\par RESPIRATORY: Respiratory rate is regular, not dyspneic on examination.\par LYMPHATICS: There is no inguinal lymphadenopathy\par INTEGUMENTARY: Skin is clean, dry, and intact about the bilateral lower extremities and lumbar spine.\par VASCULAR: There is brisk capillary refill about the bilateral lower extremities.\par NEUROLOGIC: There are no pathologic reflexes. There is no decrease in sensation of the bilateral lower extremities on Wartenberg pinwheel/ manual examination. Deep tendon reflexes are well maintained at 2+/4 of the bilateral lower extremities and are symmetric..\par MUSCULOSKELETAL: There is no visible muscular atrophy. Manual motor strength is well maintained in the Right lower extremity, left lower extremity is 3/5 plantar flexion, ankle eversion, 4/5 dorsiflexion and patient is unwilling to try knee extension and hip flexion due to pain level. Range of motion of lumbar spine is well maintained. The patient ambulates in a non-myelopathic manner. Negative tension sign and straight leg raise bilaterally. Quad extension, ankle dorsiflexion, EHL, plantar flexion, and ankle eversion are well preserved. Normal secondary orthopaedic exam of bilateral  knees and ankles\par Exam is letter by Mechanical low back pain On flexion greater than 30° extensionas well. There is tenderness to the left buttock, greater trochanter. There is pain on external rotation and flexion of the left hip into the buttock and groin. Patient has an antalgic gait. [de-identified] : X-ray of the left hip done at [facility] in March of 2022 does not demonstrate any significant degenerative joint disease or hint of AVN\par Lumbar x-ray AP lateral done on September 27, 2022 was reviewed with the patient, unremarkable

## 2022-11-10 NOTE — DISCUSSION/SUMMARY
[de-identified] : Based upon labral pathology physical medicine and rehab evaluation consideration for cortisone injections if that fails hip service referral.  Patient will follow-up as needed

## 2022-11-10 NOTE — PHYSICAL EXAM
[de-identified] : CONSTITUTIONAL: The patient is a very pleasant individual who is well-nourished and who appears stated age.\par PSYCHIATRIC: The patient is alert and oriented X 3 and in no apparent distress, and participates with orthopedic evaluation well.\par HEAD: Atraumatic and is nonsyndromic in appearance.\par EENT: No visible thyromegaly, EOMI.\par RESPIRATORY: Respiratory rate is regular, not dyspneic on examination.\par LYMPHATICS: There is no inguinal lymphadenopathy\par INTEGUMENTARY: Skin is clean, dry, and intact about the bilateral lower extremities and lumbar spine.\par VASCULAR: There is brisk capillary refill about the bilateral lower extremities.\par NEUROLOGIC: There are no pathologic reflexes. There is no decrease in sensation of the bilateral lower extremities on Wartenberg pinwheel examination. Deep tendon reflexes are well maintained at 2+/4 of the bilateral lower extremities and are symmetric..\par MUSCULOSKELETAL: There is no visible muscular atrophy. Manual motor strength is well maintained in the bilateral lower extremities. Range of motion of lumbar spine is well maintained. The patient ambulates in a non-myelopathic manner. Negative tension sign and straight leg raise bilaterally. Quad extension, ankle dorsiflexion, EHL, plantar flexion, and ankle eversion are well preserved. Normal secondary orthopaedic exam of bilateral hips, greater trochanteric area, knees and ankles.  Exam is consistent with reproducible pain with passive range of motion of the left hip consistent with internal derangement [de-identified] : X-ray of the left hip done at [facility] facility in March of 2022 does not demonstrate any significant degenerative joint disease or hint of AVN Lumbar x-ray AP lateral done on September 27, 2022 was reviewed with the patient, unremarkable.\par \par MRIs of the lumbar spine from [facility] dated October 2022 demonstrates normal lumbar MRI.  No evidence of central lateral recess or spinal stenosis MRI of the hip demonstrates labral tearing.  MRI of the left hip also from [facility] 2022 demonstrates anterior and posterior labral tears

## 2022-11-10 NOTE — HISTORY OF PRESENT ILLNESS
[de-identified] : 41-year-old male complains of low back pain which is increasing over the last 5 years. It is stabbing quality and he states it's not reproducible if you touch it. He also has focal left buttock groin and lateral hip pain which is worse with crossing his legs. He states the pain is exacerbated regarding his low back with bending lifting or any movement. It is slightly better with rest. He was enrolled in pain management and has had epidural injections last injection being one year ago. He did a recent course of physical therapy for 8 weeks on July 1, 2020 to 3 times a week specifically for low back and left hip and groin pain but indeed pain became worse and now he states he has a left footdrop, is tripping up steps and is feeling weak in his left foot.  Times, pain rates8/10,at this time great 6/10. He is taking oxycodone cyclobenzaprinegabapentin from pain management. States it is not working considerably to relieve his pain.\par Medical social surgical and family history was reviewed

## 2022-12-07 ENCOUNTER — APPOINTMENT (OUTPATIENT)
Dept: PHYSICAL MEDICINE AND REHAB | Facility: CLINIC | Age: 42
End: 2022-12-07

## 2022-12-21 ENCOUNTER — APPOINTMENT (OUTPATIENT)
Dept: RHEUMATOLOGY | Facility: CLINIC | Age: 42
End: 2022-12-21

## 2022-12-21 VITALS
BODY MASS INDEX: 17.88 KG/M2 | OXYGEN SATURATION: 99 % | HEART RATE: 80 BPM | HEIGHT: 68 IN | SYSTOLIC BLOOD PRESSURE: 120 MMHG | WEIGHT: 118 LBS | DIASTOLIC BLOOD PRESSURE: 70 MMHG | TEMPERATURE: 98.1 F

## 2022-12-21 PROCEDURE — 99214 OFFICE O/P EST MOD 30 MIN: CPT

## 2022-12-21 RX ORDER — CYCLOBENZAPRINE HYDROCHLORIDE 5 MG/1
5 TABLET, FILM COATED ORAL
Qty: 60 | Refills: 1 | Status: ACTIVE | COMMUNITY
Start: 2022-06-09 | End: 1900-01-01

## 2022-12-21 NOTE — REASON FOR VISIT
[Follow-Up: _____] : a [unfilled] follow-up visit [FreeTextEntry1] : Fibromyalgia; chronic pain; OA; labs/meds

## 2022-12-21 NOTE — HISTORY OF PRESENT ILLNESS
[FreeTextEntry1] : 42-year-old AA male, here for follow up w/ hx of anxiety/depression; chronic pain syndrome w/ pain management, Dr. 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 Denies any SI or HI.\par States he needs refill on the cymbalta and defers needing to increasing the dose on it at this time.\par Denies any swelling or redness or warmth of any joints at this time.\par Has full ROM in b/l shoulders, no pelvic/girdle stiffness and able to stand up without using her hands, no temporal pain/unremitting headaches, no vision changes, no jaw pain.\par States he notices intermittent neck pain with rotation without paresthesia and does get nerve block as well as cortisone injections to his neck and back with pain management.\par States he is on gabapentin 600mg with pain management.\par States he is a medical marijuana with pain management.\par States he notices intermittent numbness/tingling in his hands and feet. States he thinks the numbness/tingling goes into the fifth digit of the hand so unlikely carpal tunnel.\par He has hx of Rt CMC OA and states he would like to see hand specialist for it and would like referral again.\par Denies any fever/chills, no rashes, no ulcers, no dry eyes, no dry mouth, no raynaud's, no infectious diarrhea or  symptoms at this time.\par

## 2022-12-21 NOTE — ASSESSMENT
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; Fibromyalgia; and G6PD deficiency on labs.\par -No synovitis or effusion on exam noted today and advised to monitor.\par -reviewed labs 3/11/22 with normal ESR/CRP; serologies all within normal limits at this time\par -labs as below to monitor requested \par -xray LS spine 3/18/22 normal; SI normal\par -xray LT shoulder 3/18/22 slightly narrowed AC joint space\par -xray b/l feet 3/18/22 feet normal; no erosions\par -xray knees 3/18/22 normal\par -xray hips 3/18/22 normal\par -chest xray 3/18/22 without hilar adenopathy/sarcoid \par  \par Rt CMC pain: reviewed xray Rt hand/wrist 3/18/22 w/ 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\par -discussed r/b/s of voltaren gel 1% PRN w/ pt agreeable and prescription sent as below \par -states steroid injection helped a little in the past and defers it now\par -referred to Ortho hand given as requested \par \par G6PD deficiency: low G6PD on labs 3/11/22\par -discussed to avoid foods incld galen beans, meds like HCQ or sulfasalazine w/ G6PD def to prevent hemolytic anemia with hand out given \par \par Cervicalgia: tenderness in c-spine w/ rotation w/ good ROM w/o paresthesias; intermittent not much today \par -MRI 7/9/21 w/ disc herniations and disc bulge \par -upper paraspinal tightness/spams: discussed r/b/s of refilling cyclobenzaprine PRN that helps \par -states he gets steroid injection and nerve block to neck and back w/ pain management \par -on gabapentin 600 mg PO TID \par \par Intermittent numbness/tingling: intermittent in hands and feet. States tingling in b/l hands does not spare 5th digit so unlikely CTS.\par -reviewed metabolic labs 3/11/22 all normal incld folate, B12, TSH, HbA1C for it.\par -if persistent can consider EMG w/ Neuro\par \par Fibromyalgia: + tender points improved \par -on gabapentin 600mg PO daily w/ pain management, Dr. Sawant\par -discussed r/b/s of refilling Cymbalta 20mg daily w/ pt agreeable and prescription sent as below; defers needing to increase dose at this time \par -encouraged gentle exercises as tolerated\par \par Depression/Anxiety: denies any SI or HI and states feels mood is improved on the cymbalta and does not need to increase it \par -discussed r/b/s of refilling Cymbalta 20mg daily w/ pt agreeable and prescription sent as below\par \par Chronic pain syndrome: on medical marijuana and Percocet w/ pain management \par \par -educated on symptoms to monitor for in detail and alert us if any concerns.\par -knows to stay up to date on health maintenance w/ PCP\par -f/u in 3 mo or sooner as needed \par

## 2022-12-21 NOTE — PHYSICAL EXAM
[General Appearance - Alert] : alert [General Appearance - In No Acute Distress] : in no acute distress [Sclera] : the sclera and conjunctiva were normal [Extraocular Movements] : extraocular movements were intact [Outer Ear] : the ears and nose were normal in appearance [Neck Appearance] : the appearance of the neck was normal [] : no respiratory distress [Respiration, Rhythm And Depth] : normal respiratory rhythm and effort [Heart Rate And Rhythm] : heart rate was normal and rhythm regular [Heart Sounds] : normal S1 and S2 [Abdomen Soft] : soft [Abdomen Tenderness] : non-tender [Cervical Lymph Nodes Enlarged Anterior Bilaterally] : anterior cervical [Supraclavicular Lymph Nodes Enlarged Bilaterally] : supraclavicular [No CVA Tenderness] : no ~M costovertebral angle tenderness [Motor Tone] : muscle strength and tone were normal [No Focal Deficits] : no focal deficits [Impaired Insight] : insight and judgment were intact [Mood] : the mood was normal [FreeTextEntry1] : Rt CMC tenderness; No synovitis or effusion on exam noted today; Good ROM in b/l shoulders, no pelvic/girdle stiffness and able to stand up without using her hands; improved tender points

## 2022-12-28 ENCOUNTER — APPOINTMENT (OUTPATIENT)
Dept: PHYSICAL MEDICINE AND REHAB | Facility: CLINIC | Age: 42
End: 2022-12-28
Payer: MEDICAID

## 2022-12-28 VITALS
HEIGHT: 68 IN | SYSTOLIC BLOOD PRESSURE: 134 MMHG | DIASTOLIC BLOOD PRESSURE: 71 MMHG | HEART RATE: 77 BPM | BODY MASS INDEX: 17.88 KG/M2 | WEIGHT: 118 LBS

## 2022-12-28 DIAGNOSIS — M25.559 PAIN IN UNSPECIFIED HIP: ICD-10-CM

## 2022-12-28 PROCEDURE — 99204 OFFICE O/P NEW MOD 45 MIN: CPT

## 2022-12-28 NOTE — HISTORY OF PRESENT ILLNESS
[FreeTextEntry1] : Mr. MARIBEL YOU is a 42 year old male who presents with hip/back pain. Patient referred by Dr. Elaine for consideration of a L hip cortisone injection. \par \par Location:L low back/hip area\par Onset:Chronic, years, no inciting event\par Provocation/Palliative:Worse with prolonged standing/walking, better at rest\par Quality:Sharp and dull\par Radiation:Can occasionally go down back of L thigh\par Severity:8/10\par Timing:Not improving\par \par Occasionally tingling in L proximal thigh area.  Denies any associated leg weakness. Denies any loss of bowel/bladder control or any groin numbness.\par Previous medications trialed:Oxycodone 10mg BID, Gabapentin 600mg QD\par Previous procedures relevant to complaint:ESIs in past with some relief\par Conservative therapy tried?:Tried PT but couldn't tolerate it

## 2022-12-28 NOTE — DATA REVIEWED
[FreeTextEntry1] : MR L Spine 10/3/22 reviewed by me: moderate L5-S1 facet joint arthrosis seen\par \par   MR Lumbar Spine No Cont             Final\par \par No Documents Attached\par \par \par \par \par   EXAM: [id number] - MR SPINE LUMBAR  - ORDERED BY: JANETH MEJIAS\par \par \par PROCEDURE DATE:  10/03/2022\par \par \par \par INTERPRETATION:  LUMBAR SPINE MRI\par \par CLINICAL INFORMATION: Pain of 8 weeks' duration. No prior lumbar surgery.\par \par TECHNIQUE: Multiplanar, multisequence MRI was obtained of the lumbar spine.\par \par COMPARISON: Radiographs dated 3/18/2022. No prior MRI available..\par \par FINDINGS:\par \par OSSEOUS: Cantilever alignment/grade 1 anterolisthesis at L5-S1. Vertebral bodies are otherwise normal in signal, stature, and alignment without fracture or aggressive neoplasm.\par \par LEVEL BY LEVEL ANALYSIS:\par \par T12-L1: No significant abnormality.\par \par L1-L2: No significant abnormality.\par \par L2-L3: No significant abnormality.\par \par L3-L4: No significant abnormality.\par \par L4-L5: No significant abnormality.\par \par L5-S1: Mild to moderate right worse than left facet arthrosis. Spinal canal and bilateral neural canals are adequate.\par \par GENERAL: Conus medullaris tip is anatomic in positioning. No intradural or extradural lesion. Mild STIR hyperintense edema like signal within the posterior interspinous ligaments at L3-L4 and L5-S1.\par \par IMPRESSION:\par 1.  No lumbar spinal canal or neural canal stenosis.\par 2.  Subacute grade 1 strains of the posterior interspinous ligaments at L3-L4 and L5-S1 in the proper clinical setting.\par 3.  Cantilever alignment/grade retrolisthesis and mild to moderate right worse than left facet arthrosis at L5-S1.\par \par --- End of Report ---\par \par \par \par \par \par CALVIN ELDER MD; Fellow Radiologist\par This document has been electronically signed.\par STEVE MCCARTNEY MD; Attending Radiologist\par This document has been electronically signed. Oct  6 2022  3:07PM\par \par  \par \par  Ordered by: JANETH MEJIAS       Collected/Examined: 03Oct2022 04:06PM       \par Verified by: JANETH MEJIAS 07Oct2022 01:24PM       \par  Result Communication: No patient communication needed at this time;\par Stage: Final       \par  Performed at: [facility] [place]       Resulted: 06Oct2022 10:07AM       Last Updated: 07Oct2022 01:24PM       Accession: [id number]       \par \par   MR Hip No Cont, Left             Final\par \par No Documents Attached\par \par \par \par \par   EXAM: [id number] - MR HIP LT  - ORDERED BY: JANETH MEJIAS\par \par \par PROCEDURE DATE:  10/14/2022\par \par \par \par INTERPRETATION:  LEFT HIP MRI\par \par CLINICAL INDICATION: Persistent left hip pain without improvement status post physical therapy for 4 months.\par TECHNIQUE: Multiplanar, multisequence MRI was obtained of the LEFT hip.\par COMPARISON: None available.\par \par FINDINGS:\par \par LABRUM: Tear of the anterior superior labrum with 2 mm displacement (8:13). Moving posteriorly, a nondisplaced tear is seen in the superior labrum (4:10).\par HYALINE CARTILAGE AND SUBCHONDRAL BONE: Articular cartilage is maintained.\par MUSCLE AND TENDONS: Normal appearance of the imaged muscles and tendons.\par BURSAE: No focal bursal fluid collection.\par SYNOVIUM/JOINT FLUID: No hip joint effusion.\par BONE MARROW: No fracture or osteonecrosis.\par NEUROVASCULAR STRUCTURES: Normal in course and caliber.\par INTRAPELVIC AND PERIPHERAL SOFT TISSUES: Limited evaluation is within normal limits.\par \par IMPRESSION:\par Tearing of the anterior superior and superior labrum, as described above.\par \par --- End of Report ---\par \par \par \par \par \par \par CARLOS BURGOS MD; Attending Radiologist\par This document has been electronically signed. Oct 18 2022  2:56PM\par \par  \par \par  Ordered by: JANETH MEJIAS       Collected/Examined: 14Oct2022 05:10PM       \par Verified by: JANETH MEJIAS 18Oct2022 04:41PM       \par  Result Communication: No patient communication needed at this time;\par Stage: Final       \par  Performed at: [facility] [place]       Resulted: 18Oct2022 02:13PM       Last Updated: 18Oct2022 04:41PM       Accession: [id number]

## 2022-12-28 NOTE — ASSESSMENT
[FreeTextEntry1] : Mr. MARIBEL YOU is a 42 year old male who presents with left sided low back/hip pain, likely due to underlying tearing of his labrum of L hip, less likely due to underlying lumbar spondylosis. Denies any red flag signs. Will recommend:\par - MRI L hip and L Spine reviewed by me. Ortho Spine notes reviewed - recommended possible cortisone injection to L hip. Rheum notes reviewed- patient on Cymbalta for fibromyalgia pain. Prior neurosurgery notes reviewed - patient not spine surgical candidate. \par - I agree with Dr. Elaine that he may benefit from a L hip steroid injection. I explained to patient that I can do procedure but it would have to be done in an ASC under fluoroscopy. Patient follows with an outside pain management specialist, Dr. Sawant, for which he contacted and said that he could do the injection. All questions answered. Patient may return to me as needed. Patient aware of red flag signs including any changes to their bowel/bladder control, groin numbness or new weakness. Patient knows to seek immediate attention by calling 911 or going to nearest ER if these symptoms appear.

## 2022-12-28 NOTE — PHYSICAL EXAM
[FreeTextEntry1] : PE:\par Constitutional: In NAD, calm and cooperative\par MSK (Back/Hip)\par 	Inspection: no gross swelling identified\par 	Palpation: Mild tenderness of the left lumbar paraspinals and over L hip\par 	ROM: No pain with lumbar flexion to 90 degrees, mild pain with lumbar extension to 15 degrees. Pain with minimal IR of L hip and at end ER of L hip. \par 	Strength: 5/5 strength in bilateral lower extremities\par 	Reflexes: 2+ Patella reflex bilaterally, 2+ Achilles reflex bilaterally, negative clonus bilaterally\par 	Sensation: Intact to light touch in bilateral lower extremities\par Special tests:\par SLR:negative bilaterally\par JUANCARLOS:positive on L, negative on R\par FADIR:positive on L, negative on R \par Facet loading: equivocal on L, negative on R

## 2023-01-09 ENCOUNTER — APPOINTMENT (OUTPATIENT)
Dept: ORTHOPEDIC SURGERY | Facility: CLINIC | Age: 43
End: 2023-01-09
Payer: MEDICAID

## 2023-01-09 VITALS
HEART RATE: 86 BPM | SYSTOLIC BLOOD PRESSURE: 114 MMHG | DIASTOLIC BLOOD PRESSURE: 78 MMHG | BODY MASS INDEX: 18.94 KG/M2 | WEIGHT: 125 LBS | HEIGHT: 68 IN

## 2023-01-09 DIAGNOSIS — M79.646 PAIN IN UNSPECIFIED FINGER(S): ICD-10-CM

## 2023-01-09 DIAGNOSIS — M25.531 PAIN IN RIGHT WRIST: ICD-10-CM

## 2023-01-09 PROCEDURE — 73110 X-RAY EXAM OF WRIST: CPT | Mod: RT

## 2023-01-09 PROCEDURE — 20600 DRAIN/INJ JOINT/BURSA W/O US: CPT | Mod: RT

## 2023-01-09 PROCEDURE — 99214 OFFICE O/P EST MOD 30 MIN: CPT | Mod: 25

## 2023-01-09 NOTE — DISCUSSION/SUMMARY
[FreeTextEntry1] : 1.  I am hopeful that the injection to the right thumb will lead to improvement of his arthropathy and adjacent tendinopathy.\par \par #2 he was given a right wrist brace with thumb spica extension which should be of benefit for his symptomatology.  He tolerated this well

## 2023-01-09 NOTE — ASSESSMENT
[FreeTextEntry1] : ASSESSMENT:\par \par The patient comes in today with chronic right wrist and thumb pain in the form of tendinosis and arthritis.  It appears to be posttraumatic in nature.  He is an active young 42-year-old male.  At this point it is prudent to continue with nonoperative modalities in the form of bracing and or injections.  The patient agrees with the above.\par [This is likely to diminish bodily function for the extremity.] \par \par [For this I was able to review other physician’s note(s)]\par [The patient was given an injection today.]\par Injection:\par \par The risks and benefits of a steroid injection were discussed in detail. The risks include but are not limited to: pain, infection, swelling, flare response, bleeding, subcutaneous fat atrophy, skin depigmentation and/or elevation of blood sugar. The risk of incomplete resolution of symptoms, recurrence and additional intervention was reviewed and considered by the patient. \par The patient agreed to proceed and under a sterile prep, I injected 1 units into 2 cc of a combination of Celestone and Lidocaine into the right thumb CMC joint. The patient tolerated the injection well.\par \par He was adequately and thoroughly informed of my assessment of their current condition(s). \par \par \par \par

## 2023-01-09 NOTE — PHYSICAL EXAM
[de-identified] : He is well-appearing on examination\par \par Examination of the right wrist reveals tenderness at the first dorsal compartment with a positive Finkelstein.\par Examination of the right thumb reveals deformity and obvious tenderness at the level of the CMC joint with a positive grind test for pain and crepitus [de-identified] : [4] views of bilateral hands and wrists were obtained today in my office and were seen by me and discussed with the patient. \par These show findings consistent with right thumb basal joint osteoarthritis.  This appears posttraumatic in nature\par

## 2023-01-09 NOTE — HISTORY OF PRESENT ILLNESS
[FreeTextEntry1] : Sudeep is a pleasant 42-year-old male who presents today with chronic right thumb and wrist pain.  He had a accident back in 2017 where he fractured his right thumb he tells me.

## 2023-02-15 ENCOUNTER — APPOINTMENT (OUTPATIENT)
Dept: PHYSICAL MEDICINE AND REHAB | Facility: CLINIC | Age: 43
End: 2023-02-15
Payer: MEDICAID

## 2023-02-15 DIAGNOSIS — M47.816 SPONDYLOSIS W/OUT MYELOPATHY OR RADICULOPATHY, LUMBAR REGION: ICD-10-CM

## 2023-02-15 DIAGNOSIS — S73.192A OTHER SPRAIN OF LEFT HIP, INITIAL ENCOUNTER: ICD-10-CM

## 2023-02-15 PROCEDURE — 99213 OFFICE O/P EST LOW 20 MIN: CPT

## 2023-02-15 NOTE — HISTORY OF PRESENT ILLNESS
[FreeTextEntry1] : Mr. MARIBEL YOU is a 42 year old  male who presents for follow up. At last visit, he was going to see his outside pain management specialist to undergo a L hip CSI. He got the CSI to the L hip around weeks ago. He says he has some relief with it. He is eager to star tPT. \par \par Location:L low back/hip area\par Onset:Chronic, years, no inciting event\par Provocation/Palliative:Worse with prolonged standing/walking, better at rest\par Quality:Sharp and dull\par Radiation:Can occasionally go down back of L thigh\par Severity:Around 8/10 now\par Timing:slight pain with movement. \par \par No bowel/bladder changes. No groin numbness.

## 2023-02-15 NOTE — ASSESSMENT
[FreeTextEntry1] : Mr. MARIBEL YOU is a 42 year old male who presents with left sided low back/hip pain, likely due to underlying tearing of his labrum of L hip, less likely due to underlying lumbar spondylosis. Denies any red flag signs. Will recommend:\par - Now s/p L hip intraarticular CSI with some relief. Would recommend he start PT 2-3x/week for stretching, strengthening, ROM exercises, HEP and modalities PRN including myofascial release, moist heat \par - Will refer patient to Ortho Sports for evaluation of tear\par \par RTC in 4-5 weeks. Patient in agreement with plan. Patient aware of red flag signs including any changes to their bowel/bladder control, groin numbness or new weakness. Patient knows to seek immediate attention by calling 911 or going to nearest ER if these symptoms appear.

## 2023-02-22 PROBLEM — Z00.00 ENCOUNTER FOR PREVENTIVE HEALTH EXAMINATION: Noted: 2023-02-22

## 2023-03-20 ENCOUNTER — APPOINTMENT (OUTPATIENT)
Dept: RHEUMATOLOGY | Facility: CLINIC | Age: 43
End: 2023-03-20
Payer: MEDICAID

## 2023-03-20 VITALS
BODY MASS INDEX: 18.19 KG/M2 | SYSTOLIC BLOOD PRESSURE: 120 MMHG | HEIGHT: 68 IN | WEIGHT: 120 LBS | DIASTOLIC BLOOD PRESSURE: 68 MMHG | HEART RATE: 90 BPM | TEMPERATURE: 97.5 F | OXYGEN SATURATION: 97 %

## 2023-03-20 DIAGNOSIS — M19.019 PRIMARY OSTEOARTHRITIS, UNSPECIFIED SHOULDER: ICD-10-CM

## 2023-03-20 DIAGNOSIS — M18.9 OSTEOARTHRITIS OF FIRST CARPOMETACARPAL JOINT, UNSPECIFIED: ICD-10-CM

## 2023-03-20 DIAGNOSIS — M79.7 FIBROMYALGIA: ICD-10-CM

## 2023-03-20 DIAGNOSIS — D75.A GLUCOSE-6-PHOSPHATE DEHYDROGENASE: ICD-10-CM

## 2023-03-20 DIAGNOSIS — G89.29 OTHER CHRONIC PAIN: ICD-10-CM

## 2023-03-20 PROCEDURE — 99214 OFFICE O/P EST MOD 30 MIN: CPT

## 2023-03-20 RX ORDER — DICLOFENAC SODIUM 1% 10 MG/G
1 GEL TOPICAL
Qty: 1 | Refills: 0 | Status: ACTIVE | COMMUNITY
Start: 2022-06-09 | End: 1900-01-01

## 2023-03-20 RX ORDER — OXYCODONE 10 MG/1
10 TABLET ORAL
Qty: 60 | Refills: 0 | Status: ACTIVE | COMMUNITY
Start: 2023-03-15

## 2023-03-20 RX ORDER — DULOXETINE HYDROCHLORIDE 30 MG/1
30 CAPSULE, DELAYED RELEASE PELLETS ORAL
Qty: 30 | Refills: 2 | Status: ACTIVE | COMMUNITY
Start: 2022-06-09 | End: 1900-01-01

## 2023-03-20 RX ORDER — NALOXONE HYDROCHLORIDE 4 MG/.1ML
4 SPRAY NASAL
Qty: 2 | Refills: 0 | Status: ACTIVE | COMMUNITY
Start: 2023-03-15

## 2023-03-20 NOTE — PHYSICAL EXAM
[General Appearance - Alert] : alert [General Appearance - In No Acute Distress] : in no acute distress [Sclera] : the sclera and conjunctiva were normal [Extraocular Movements] : extraocular movements were intact [Outer Ear] : the ears and nose were normal in appearance [Neck Appearance] : the appearance of the neck was normal [Respiration, Rhythm And Depth] : normal respiratory rhythm and effort [Heart Rate And Rhythm] : heart rate was normal and rhythm regular [Heart Sounds] : normal S1 and S2 [Abdomen Soft] : soft [Abdomen Tenderness] : non-tender [Cervical Lymph Nodes Enlarged Anterior Bilaterally] : anterior cervical [Supraclavicular Lymph Nodes Enlarged Bilaterally] : supraclavicular [No CVA Tenderness] : no ~M costovertebral angle tenderness [Motor Tone] : muscle strength and tone were normal [] : no rash [No Focal Deficits] : no focal deficits [Impaired Insight] : insight and judgment were intact [Mood] : the mood was normal [FreeTextEntry1] : No synovitis or effusion on exam noted today; Good ROM in b/l shoulders, no pelvic/girdle stiffness and able to stand up without using his hands; + multiple tender points incld sternum; upper/lower back;elbows;hips; medial knees

## 2023-03-20 NOTE — ASSESSMENT
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; Fibromyalgia; and G6PD deficiency on labs.\par -No synovitis or effusion on exam noted today and advised to monitor.\par -reviewed labs from [facility] 3/17/23 w/ CMP ok; CBC ok; B12 normal; HbA1C normal; 3/11/22 with normal ESR/CRP; serologies all within normal limits at this time\par -lab as below to monitor requested \par -xray LS spine 3/18/22 normal; SI normal\par -xray LT shoulder 3/18/22 slightly narrowed AC joint space\par -xray b/l feet 3/18/22 feet normal; no erosions\par -xray knees 3/18/22 normal\par -xray hips 3/18/22 normal\par -chest xray 3/18/22 without hilar adenopathy/sarcoid \par  \par Rt CMC pain: xray Rt hand/wrist 3/18/22 w/ 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\par -discussed r/b/s of refilling voltaren gel 1% PRN w/ pt agreeable and prescription sent as below \par -states did steroid injection w/ hand Ortho and told to hold off on surgery he reports \par \par G6PD deficiency: low G6PD on labs 3/11/22\par -discussed to avoid foods incld galen beans, meds like HCQ or sulfasalazine w/ G6PD def to prevent hemolytic anemia with hand out given \par \par Cervicalgia: tenderness in c-spine w/ rotation w/ good ROM w/o paresthesias; intermittent not much today \par -MRI 7/9/21 w/ disc herniations and disc bulge \par -upper paraspinal tightness/spams: cyclobenzaprine PRN that helps \par -states he gets steroid injection and nerve block to neck and back w/ pain management \par -on gabapentin 600 mg PO TID \par \par Intermittent numbness/tingling: intermittent in hands and feet. States tingling in b/l hands does not spare 5th digit so unlikely CTS.\par -reviewed metabolic labs 3/17/23 w/ normal O81=761; BsG7V=2.8; 3/11/22 all normal incld folate, B12, TSH, HbA1C for it.\par -if persistent can consider EMG w/ Neuro\par \par Fibromyalgia: + tender points present & would like to increase his cymbalta slowly to see if helps more \par -discussed r/b/s of increasing Cymbalta 20 mg daily to Cymbalta 30 mg daily w/ pt agreeable and prescription sent as below\par -on gabapentin 600mg PO daily w/ pain management, Dr. Sawant\par -encouraged gentle exercises as tolerated\par \par Depression/Anxiety: denies any SI or HI and states feels mood is improved on the cymbalta and would like to increase slowly to see if helps more with the fibromyalgia \par -discussed r/b/s of increasing Cymbalta 20 mg daily to Cymbalta 30 mg daily w/ pt agreeable and prescription sent as below\par \par Chronic pain syndrome: on medical marijuana and Percocet w/ pain management \par \par -educated on symptoms to monitor for in detail and alert us if any concerns.\par -knows to stay up to date on health maintenance w/ PCP\par -f/u in 2-3 mo or sooner as needed \par

## 2023-03-20 NOTE — HISTORY OF PRESENT ILLNESS
[FreeTextEntry1] : 42-year-old AA male, here for follow up w/ hx of anxiety/depression; chronic pain syndrome w/ pain management, Dr. 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 Denies any SI or HI.\par Denies any swelling or redness or warmth of any joints at this time.\par Has full ROM in b/l shoulders, no pelvic/girdle stiffness and able to stand up without using her hands, no temporal pain/unremitting headaches, no vision changes, no jaw pain.\par States he notices intermittent neck pain with rotation without paresthesia and does get nerve block as well as cortisone injections to his neck and back with pain management.\par States he is on gabapentin 600 mg with pain management.\par States he is a medical marijuana with pain management.\par States he notices intermittent numbness/tingling in his hands and feet. States he thinks the numbness/tingling goes into the fifth digit of the hand so unlikely carpal tunnel.\par He has hx of Rt CMC OA and states he saw hand specialist who gave him steroid injection and told to hold off on surgery. \par Denies any fever/chills, no rashes, no ulcers, no dry eyes, no dry mouth, no raynaud's, no infectious diarrhea or  symptoms at this time.\par

## 2023-03-20 NOTE — REASON FOR VISIT
[Follow-Up: _____] : a [unfilled] follow-up visit [FreeTextEntry1] : Fibromyalgia; chronic pain; OA; labs/meds. \par

## 2023-03-21 ENCOUNTER — APPOINTMENT (OUTPATIENT)
Dept: ORTHOPEDIC SURGERY | Facility: CLINIC | Age: 43
End: 2023-03-21

## 2023-05-10 ENCOUNTER — APPOINTMENT (OUTPATIENT)
Dept: RHEUMATOLOGY | Facility: CLINIC | Age: 43
End: 2023-05-10

## 2023-07-12 ENCOUNTER — EMERGENCY (EMERGENCY)
Facility: HOSPITAL | Age: 43
LOS: 1 days | Discharge: HOME | End: 2023-07-12
Attending: EMERGENCY MEDICINE
Payer: MEDICAID

## 2023-07-12 VITALS
RESPIRATION RATE: 17 BRPM | HEART RATE: 100 BPM | DIASTOLIC BLOOD PRESSURE: 70 MMHG | WEIGHT: 122.14 LBS | HEIGHT: 68 IN | SYSTOLIC BLOOD PRESSURE: 123 MMHG | TEMPERATURE: 98 F | OXYGEN SATURATION: 96 %

## 2023-07-12 VITALS
TEMPERATURE: 98 F | SYSTOLIC BLOOD PRESSURE: 120 MMHG | DIASTOLIC BLOOD PRESSURE: 66 MMHG | OXYGEN SATURATION: 98 % | HEART RATE: 78 BPM | RESPIRATION RATE: 16 BRPM

## 2023-07-12 DIAGNOSIS — Z98.890 OTHER SPECIFIED POSTPROCEDURAL STATES: Chronic | ICD-10-CM

## 2023-07-12 PROCEDURE — 73590 X-RAY EXAM OF LOWER LEG: CPT

## 2023-07-12 PROCEDURE — 73610 X-RAY EXAM OF ANKLE: CPT | Mod: 26,LT

## 2023-07-12 PROCEDURE — 99284 EMERGENCY DEPT VISIT MOD MDM: CPT

## 2023-07-12 PROCEDURE — 73610 X-RAY EXAM OF ANKLE: CPT

## 2023-07-12 PROCEDURE — 73630 X-RAY EXAM OF FOOT: CPT

## 2023-07-12 PROCEDURE — 73590 X-RAY EXAM OF LOWER LEG: CPT | Mod: 26,LT

## 2023-07-12 PROCEDURE — 73630 X-RAY EXAM OF FOOT: CPT | Mod: 26,LT

## 2023-07-12 NOTE — ED PROVIDER NOTE - CARE PROVIDER_API CALL
Rajinder Donovan  Orthopaedic Surgery  [address] [place] [id number]  Phone: [phone]  Fax: [phone]  Follow Up Time:

## 2023-07-12 NOTE — ED PROVIDER NOTE - PHYSICAL EXAMINATION
Gen: No acute distress, non toxic  HEENT: Mucous membranes moist, pink conjunctivae, EOMI  CV: RRR, nl s1/s2.  Resp: CTAB, normal rate and effort  GI: Abdomen soft, NT, ND. No rebound, no guarding  : No CVAT  Neuro: A&O x 3, moving all 4 extremities  MSK: +ttp over the ATFL area on left. +ttp over the 4th-5th mid metatarsal area. FROM LE b/l. compartments soft/compressible   Skin: No rashes. intact and perfused.

## 2023-07-12 NOTE — ED PROVIDER NOTE - PATIENT PORTAL LINK FT
You can access the FollowMyHealth Patient Portal offered by [facility] by registering at the following website: [email or web address]. By joining [facility]’s FollowMyHealth portal, you will also be able to view your health information using other applications (apps) compatible with our system.

## 2023-07-12 NOTE — ED PROVIDER NOTE - ATTENDING APP SHARED VISIT CONTRIBUTION OF CARE
The patient discussed with ACP.    Ankle Pain    I, Rinku Zee, performed the initial face to face bedside interview with this patient regarding history of present illness, review of symptoms and relevant past medical, social and family history.  I completed an independent physical examination.  I was the initial provider who evaluated this patient. I have signed out the follow up of any pending tests (i.e. labs, radiological studies) to the ACP.  I have communicated the patient’s plan of care and disposition with the ACP.

## 2023-07-12 NOTE — ED PROVIDER NOTE - NS ED ATTENDING STATEMENT MOD
This was a shared visit with the ASHLEE. I reviewed and verified the documentation and independently performed the documented:

## 2023-07-12 NOTE — ED ADULT NURSE NOTE - NSICDXFAMILYHX_GEN_ALL_CORE_FT
FAMILY HISTORY:  Mother  Still living? No  Family history of HF (heart failure), Age at diagnosis: Age Unknown    Grandparent  Still living? No  Family history of pneumothorax, Age at diagnosis: Age Unknown

## 2023-07-12 NOTE — ED ADULT NURSE NOTE - OBJECTIVE STATEMENT
pt received in [facility]. Patient A&Ox4 complaining of L ankle pain s/p rolling it yesterday while walking.  No deformity noted. Did not self medicate for pain PTA. NAD noted, respirations even and unlabored.  Safety precautions in place.  Plan of care explained, pt verbalized understanding.

## 2023-07-12 NOTE — ED PROVIDER NOTE - CLINICAL SUMMARY MEDICAL DECISION MAKING FREE TEXT BOX
xrays negative for acute pathology. aircast placed. neurovascularly intact. no fnd's. strict return precautions explained.

## 2023-07-12 NOTE — ED ADULT NURSE NOTE - NSICDXPASTMEDICALHX_GEN_ALL_CORE_FT
PAST MEDICAL HISTORY:  Asthma unable to take meds    Chronic back pain     Frequency of urination     Migraine     Phimosis/adherent prepuce     Pneumothorax     Urgency of urination

## 2023-07-12 NOTE — ED PROVIDER NOTE - OBJECTIVE STATEMENT
41 yo male with pmhx of sciatica  presents with left ankle pain since yesterday. States that he has issues with his left leg secondary to sciatica, was walking yesterday on the sidewalk and states his left ankle rolled/inverted.     Denies fever, chills, body aches, dizziness, LOC, vision changes, cp, palpitations, sob, paresthesias in the extremities, coolness/changes in color to the extremities, rashes.

## 2023-07-12 NOTE — ED ADULT TRIAGE NOTE - BP NONINVASIVE DIASTOLIC (MM HG)
68 Unable to determine Suicide Risk Risk factors: End stage chronic illness, unable to fully assess  Protective factors: Securely domiciled, unable to fully assess

## 2024-03-06 ENCOUNTER — NON-APPOINTMENT (OUTPATIENT)
Age: 44
End: 2024-03-06

## 2024-03-18 NOTE — ASU PATIENT PROFILE, ADULT - AS SC BRADEN NUTRITION
Referral from JOCELYN Brower regarding snoring and possible obstructive apnea.    Patient thinks he has always had some apnea. Has a hx of snoring. He feels that he sleeps through the night mostly. Some tossing and turning. Denies potty breaks at night. Slightly groggy in the am most days. If he is sitting for a period of time he will fall asleep, especially in the car. Has an upcoming cardiac ablation and would like to be tested before the procedure if possible. [place]: 14/24.    STOP-BANG Screening  Stop Bang Total Score (out of 8): 6     Patient Active Problem List   Diagnosis   • Acute hematogenous osteomyelitis, left tibia and fibula (CMD)   • Alcoholic liver disease (CMD)   • Anemia   • Cellulitis of left ankle   • Chronic hepatitis C virus infection with cirrhosis (CMD)   • COPD (chronic obstructive pulmonary disease) (CMD)   • Esophageal varices (CMD)   • History of alcohol abuse   • Venous ulcer of left leg (CMD)   • Hip pain   • LLE cellulitis and periprosthetic abscess   • Status post revision of total hip   • Hemorrhagic shock (CMD)   • Acute blood loss anemia   • Cardiomyopathy (CMD)   • Chronic HFrEF (heart failure with reduced ejection fraction) (CMD)   • Osteomyelitis (CMD)         Past Medical History:   Diagnosis Date   • AF (atrial fibrillation) (CMD)    • Anxiety    • Arthritis    • Asthma    • Chronic pain    • Congestive cardiac failure (CMD)    • COPD (chronic obstructive pulmonary disease) (CMD)    • Depression    • Fracture    • Gastroesophageal reflux disease    • Liver disease    • PONV (postoperative nausea and vomiting)        Past Surgical History:   Procedure Laterality Date   • Colonoscopy diagnostic  04/20/2022   • Colonoscopy diagnostic  02/02/2021   • Fracture surgery     • Hip arthroplasty Left 05/10/2023    Revision and removal of antibiotic spacer   • Incision and drainage hip Left 11/02/2022    down to and including joint. Left hip evacuation of hematoma   • Incision and  drainage hip Left 10/17/2022    down to and including joint. Left hip evacuation of hematoma   • Joint replacement Left 12/2021   • Removal gallbladder  04/29/2014   • Stab phlebectomy richelle veins Left 03/20/2020    ligation of veins on left leg with ultasound 12 stabs, phlebectomy, closure of saphenous vein       ALLERGIES:   Allergen Reactions   • Penicillins HIVES     Tolerated cefepime (cephalosporin) oct 2022   • Sulfamethoxazole W/Trimethoprim HIVES       Current Outpatient Medications   Medication Sig Dispense Refill   • losartan (COZAAR) 25 MG tablet Take 1 tablet by mouth daily. (Patient not taking: Reported on 3/18/2024) 90 tablet 0   • metoPROLOL succinate (Toprol XL) 25 MG 24 hr tablet Take 2 tablets by mouth in the morning and 2 tablets in the evening. 180 tablet 0   • Eliquis 5 MG Tab Take 1 tablet by mouth in the morning and 1 tablet in the evening. 60 tablet 1   • empagliflozin (Jardiance) 10 MG tablet TAKE 1 TABLET BY MOUTH EVERY DAY BEFORE BREAKFAST 30 tablet 2   • Wound Cleansers (Vashe Wound) 0.033 % Solution Apply 1 application. topically in the morning and 1 application. in the evening. 1000 mL 1   • oxyCODONE HCl 10 MG immediate release tablet Take 1 tablet by mouth every 6 hours as needed (Pain). 30 tablet 0   • Sodium Hypochlorite (Dakins, 1/4 strength,) 0.125 % Solution For wet wound dressings twice a day if Hydrofera Blue is not available. 1 each 3   • Lidocaine, Anorectal, 5 % Gel Apply 2 g topically every 24 hours as needed.     • bumetanide (BUMEX) 1 MG tablet Take 1 mg by mouth daily.     • fentaNYL (DURAGESIC) 12 MCG/HR patch Place 1 patch onto the skin every 72 hours.     • gabapentin (NEURONTIN) 300 MG capsule Take 1 capsule by mouth in the morning and 1 capsule at noon and 1 capsule in the evening. (Patient taking differently: Take 300 mg by mouth in the morning and 300 mg at noon and 300 mg in the evening. Patient takes one in the am , 1 at noon and 2 at HS  3/18/2024) 30 capsule 0    • lactulose (CHRONULAC) 10 GM/15ML solution Take 30 mLs by mouth in the morning and 30 mLs in the evening. 946 mL 0   • DULoxetine (CYMBALTA) 60 MG capsule Take 60 mg by mouth in the morning and 60 mg in the evening.     • Flovent  MCG/ACT inhaler INHALE 2 PUFFS ORALLY EVERY 12 HOURS     • Prenatal Vit-Fe Fumarate-FA (PRENATAL vitamin & mineral w/ folic acid 1 mg) 27-1 MG tablet Take 1 tablet by mouth daily. Do not start before November 17, 2022. 30 tablet    • mirtazapine (REMERON) 15 MG tablet Take 7.5 mg by mouth at bedtime.     • naLOXone (NARCAN) 4 MG/0.1ML nasal spray Spray 1 spray in each nostril as needed for Opioid Reversal.     • Lactobacillus (Florajen Acidophilus) capsule Take 1 capsule by mouth every morning.     • Spacer/Aero-Hold Chamber Bags Misc Use with your albuterol inhaler as directed. Indications: Asthma     • albuterol 108 (90 Base) MCG/ACT inhaler Inhale 2 puffs into the lungs every 4 hours as needed.     • acetaminophen (TYLENOL) 325 MG tablet Take 650 mg by mouth every 4 hours as needed.       No current facility-administered medications for this visit.       Social History     Socioeconomic History   • Marital status: Single     Spouse name: Not on file   • Number of children: Not on file   • Years of education: Not on file   • Highest education level: Not on file   Occupational History   • Not on file   Tobacco Use   • Smoking status: Never   • Smokeless tobacco: Never   Vaping Use   • Vaping Use: never used   Substance and Sexual Activity   • Alcohol use: Not Currently     Alcohol/week: 12.0 standard drinks of alcohol     Types: 12 Cans of beer per week     Comment: States he quit six months ago   • Drug use: Never   • Sexual activity: Not on file   Other Topics Concern   • Not on file   Social History Narrative   • Not on file     Social Determinants of Health     Financial Resource Strain: Low Risk  (11/6/2023)    Financial Resource Strain    • Unable to Get: None   Food  Insecurity: Not At Risk (11/6/2023)    Food Insecurity    • Food Insecurity: Worried or Stressed about Money for Food: Never   Transportation Needs: Not At Risk (11/6/2023)    PRAPARE - Transportation    • Lack of Transportation (Medical): No    • Lack of Transportation (Non-Medical): No   Physical Activity: Not on file   Stress: Not on file   Social Connections: Low Risk  (11/6/2023)    Social Connections    • Social Connectivity: 5 or more times a week   Interpersonal Safety: Not At Risk (11/6/2023)    Interpersonal Safety    • Social Determinants: Intimate Partner Violence Past Fear: No    • Social Determinants: Intimate Partner Violence Current Fear: No       REVIEW OF SYSTEMS:  CONSTITUTIONAL: Denies fever, recent weight changes but has fatigue and daytime somnolence  EYES: Denies trouble with eyes or vision.  Denies morning headaches.    ENT: Denies trouble with ears or hearing.  Denies nasal congestion or nose bleeds.  Denies trouble smelling.   CV: Denies chest pain, chest heaviness, palpitations   RESPIRATORY: Denies trouble breathing (shortness of breath), wheezing, coughing spells, phlegm.  GI:  Denies abdominal trouble with stomach or digestion.  Denies vomiting.    MSK: Denies aching muscles or joint pain.   SKIN:  Denies concerns.   NEURO:  Denies tremors. Denies restless arms/legs.  PSYCH: Denies depression or anxiety.  HEME/LYMPH:  Denies anemia or blood disorder.     Visit Vitals  /58 (BP Location: LUE - Left upper extremity, Patient Position: Sitting, Cuff Size: Regular)   Pulse 97   Ht 5' 5\" (1.651 m)   Wt 96.6 kg (213 lb)   SpO2 97%   BMI 35.45 kg/m²     EXAMINATION:  Estimated body mass index is 35.45 kg/m² as calculated from the following:    Height as of this encounter: 5' 5\" (1.651 m).    Weight as of this encounter: 96.6 kg (213 lb).   GENERAL APPEARANCE: alert, oriented x 3.  FACE and NECK:normocephalic, neck is supple without thyroid enlargement or lymphadenopathy.  Circumference is  17.50 inches.  MOUTH: Dentition in good repair. Dental occlusion normal. Retrognathia present   Pharyngeal space: crowded, excess soft tissue, mounded tongue Mallampati score:4 Tonsillar enlargement: none  NOSE: Nares patent without septal deviation. Minimal turbinate swelling, no erythema.   HEART: RRR, no murmur  LUNGS: CTA without wheezing  EXT: no lower extremity edema  NEURO: normal speech, no tremor, normal gait  PSYCH: mood and affect normal    TSH (mcUnits/mL)   Date Value   11/02/2023 2.460     HGB (g/dL)   Date Value   02/13/2024 12.3 (L)     ASSESSMENT:  1. Daytime sleepiness    2. Snoring      PLAN:  Pt has signs and symptoms suggestive of obstructive sleep apnea as evidenced by soft tissue abnormalities of the upper airway, excessive daytime sleepiness (ESS >10), habitual snoring, BMI greater than 30, Neck circumference greater than 17\" in men  Reviewed with patient the causes of sleep apnea, the testing procedures and the treatments for sleep apnea.  Patient agreed to sleep study to evaluate for sleep apnea.  Recommended weight loss, avoidance of alcohol and sedating medications at bedtime, side sleeping.  If test is positive then will recommend: Autopap [id number], mask per patient preference.     Orders Placed This Encounter   • SLEEP STUDY HOME 4 CHANNEL PORTABLE UNATTENDED     Return for Embletta.    Thank you JOCELYN Brower for this referral.     Dr. Alejo is my supervisory/collaborative physician.   (4) excellent

## 2024-04-19 NOTE — ED STATDOCS - TEMPLATE, MLM
[place] [address], [place] [address]  P [phone]  F [phone]    PATIENT:  Nannette Kearns   : 1954  Referring physician: Dean Dai DO      CHIEF COMPLAINT:   No chief complaint on file.      HISTORY OF PRESENT ILLNESS:  Nannette is a 69 year old female with  hypertension, hyperlipidemia, and cardiomyopathy initially diagnosed in the setting of COVID 19 infection in 2022.  LV function at that time was 20% and she had at least moderate MR and severe dilation of her left atrium. It appears at some point she had some complaints of dizziness at an outside facility,  and eventually had LifeVest placed on her.      She then presented to [facility] 2022 for syncope which is where our group met the patient.  [facility] representative was contacted and indeed no arrhythmias were noted.  Troponins mildly positive she was referred for left heart cath which showed moderate nonobstructive coronary disease.  EF remained low at 30%.  She was started on beta-blocker and ARB and discharged home.  Repeat echocardiogram was performed 2023 showed EF of 50%.  On follow-up that she was noted to be hyperkalemic and spironolactone was stopped.  Repeat echo was ordered and performed 2023 which showed normal EF.    She is here for follow-up visit.  She has no new complaints.  No chest pain palpitation near syncope or syncope.  Tolerating medicines well.  No bleeding.  No falls.      PAST MEDICAL HISTORY:    Nonischemic cardiomyopathy with recovered ejection fraction  Nonobstructive coronary disease    ALLERGIES:    ALLERGIES:  No Known Allergies    MEDICATIONS:     Current Outpatient Medications   Medication Sig Dispense Refill    hydroCHLOROthiazide 12.5 MG tablet Take 1 tablet by mouth daily. 90 tablet 1    metoPROLOL succinate (TOPROL-XL) 25 MG 24 hr tablet TAKE 2 TABLETS BY MOUTH DAILY 180 tablet 3    losartan (COZAAR) 100 MG tablet TAKE 1 TABLET BY MOUTH DAILY 90 tablet 3     atorvastatin (LIPITOR) 40 MG tablet TAKE 1 TABLET BY MOUTH EVERY EVENING 90 tablet 3    Jardiance 10 MG tablet TAKE 1 TABLET BY MOUTH EVERY DAY BEFORE BREAKFAST 90 tablet 3    aspirin 81 MG EC tablet Take 1 tablet by mouth daily. Do not start before August 1, 2022. 30 tablet 0    Multiple Vitamin (MULTIVITAMIN ADULT PO) Take 1 tablet by mouth daily.      cholecalciferol (VITAMIN D) 25 mcg (1,000 units) tablet Take 25 mcg by mouth daily.      MAGNESIUM PO Take 1 tablet by mouth daily.       No current facility-administered medications for this visit.       SOCIAL HISTORY:    Social History     Tobacco Use    Smoking status: Never    Smokeless tobacco: Never   Vaping Use    Vaping status: never used    Passive vaping exposure: Yes   Substance Use Topics    Alcohol use: Not Currently    Drug use: Never       FAMILY HISTORY:    Family History   Problem Relation Age of Onset    Heart disease Mother     Hypertension Mother     Patient is unaware of any medical problems Father     Pacemaker Sister        REVIEW OF SYSTEMS:    Respiratory: Cough: denies.   Constitutional: Fatigue: denies.   Dermatologic: Rash: denies.   Endocrine: Sleep disturbance: denies.   Gastrointestinal: Abdomina pain: denies.   Hematologic: Abnormal bleeding: denies.   Musculoskeletal: Muscle aches: denies.   Neurologic: Dizziness: denies.   Ophthalmologic: Loss of vision: denies.   Psychology: Depression: denies.   Urologic: Blood in urine: denies.   Cardiac: As per HPI    Remainder are reviewed and are negative.       PHYSICAL EXAMINATION:  There were no vitals taken for this visit.    Constitutional: appears well-developed and well-nourished. Alert and oriented.   Head: Normocephalic and atraumatic.   Nose: Nose normal.   Mouth/Throat: Mucous membranes are moist.  Normal oropharynx  Eyes: Pupils are equal, round, and reactive to light.   Neck: Normal range of motion. Neck supple. No thyromegaly  Cardiovascular: Regular rate and rhythm, normal  General S1, S2 no S3 or S4. No murmur, rub or gallops  Pulmonary: Effort normal and breath sounds normal. No wheezes, rales or rhonchi  Abdominal: Soft. Bowel sounds are normal. No hepatosplenomegaly.  Musculoskeletal: Normal range of motion. No tenderness.   Neurological: grossly normal.   Skin: Skin is warm and dry.   Psych: normal mood and affect  Extremities: no significant edema  Pulses: LE DP/PT palpable    I personally reviewed and interpreted recent laboratory values in the chart.   LABS  Cardiac Labs   01/05/22 12:32 01/05/22 14:32 07/27/22 18:11 07/27/22 20:10 07/28/22 03:53   Troponin I, High Sensitivity 445 (HH) 460 (HH) 208 (HH) 184 (HH) 251 (HH)     Sodium (mmol/L)   Date Value   03/28/2024 140     Potassium (mmol/L)   Date Value   03/28/2024 4.1     Chloride (mmol/L)   Date Value   03/28/2024 106     Glucose (mg/dL)   Date Value   03/28/2024 100 (H)     Calcium (mg/dL)   Date Value   03/28/2024 10.1     Carbon Dioxide (mmol/L)   Date Value   03/28/2024 30     BUN (mg/dL)   Date Value   03/28/2024 36 (H)     Creatinine (mg/dL)   Date Value   03/28/2024 0.90       GOT/AST (Units/L)   Date Value   12/28/2023 24     GPT/ALT (Units/L)   Date Value   12/28/2023 42     No results found for: \"GGTP\"  Alkaline Phosphatase (Units/L)   Date Value   12/28/2023 67     Bilirubin, Total (mg/dL)   Date Value   12/28/2023 0.3        WBC (K/mcL)   Date Value   12/28/2023 10.1     RBC (mil/mcL)   Date Value   12/28/2023 4.43     HCT (%)   Date Value   12/28/2023 40.7     HGB (g/dL)   Date Value   12/28/2023 13.2     PLT (K/mcL)   Date Value   12/28/2023 227       Hemoglobin A1C (%)   Date Value   07/30/2022 6.1 (H)       Cholesterol (mg/dL)   Date Value   06/17/2023 148     HDL (mg/dL)   Date Value   06/17/2023 59     Triglycerides (mg/dL)   Date Value   06/17/2023 62     LDL (mg/dL)   Date Value   06/17/2023 77       IMAGING STUDIES/CARDIAC TESTING:   I have reviewed the pertinent imaging study reports. These are the pertinent  findings:    EKG 7/27/2022, personally reviewed, reveals SR with LVH    TTE   5/2023: 1. Left ventricle: The cavity size is normal. Wall thickness is normal. Systolic function is normal. The ejection fraction was measured by biplane method of disks. Doppler parameters are consistent with abnormal left ventricular relaxation (grade 1 diastolic dysfunction). The ejection fraction is 50%. 2. Left atrium: The atrium is mildly to moderately dilated. 3. Right ventricle: The cavity size is normal. Wall thickness is normal. Systolic function is normal.   7/28/2022:   1. Left ventricle cavity size is normal. Wall thickness is normal.     Systolic function is moderately to severely reduced with EF at 30%.     Severe global hypokinesis. Doppler parameters are consistent with    abnormal left ventricular relaxation (grade 1 diastolic dysfunction).   2. Left atrium: The atrium is mildly to moderately dilated.  3. Right ventricle: The cavity size is normal. Wall thickness is normal.     Systolic function is normal.  11/2023:   1. Left ventricle: The cavity size is normal. Wall thickness is normal.     Systolic function is normal. The ejection fraction is 55%.  2. Right ventricle: The cavity size is normal. Wall thickness is normal.     Systolic function is normal.    RLHC 7/29/22:   Prox Cx to Mid Cx lesion with 40% stenosis.  LPAV lesion with 40% stenosis.  Prox LAD to Mid LAD lesion with 40% stenosis.     1) Non-obstructive coronary artery disease.  2) Normal LV filling pressures.    ASSESSMENT AND PLAN:    Nonischemic cardiomyopathy  angiogram done 7/29/2022 shows nonobstructive CAD and normal filling pressures.  She has recovered ejection fraction noted most recently 11/2023.  She is on metoprolol, spironolactone, Jardiance.  She is off spironolactone due to hyperkalemia.      Hypertension  Blood pressure is well-controlled.  No changes at this time.      CAD  Obstructive pulm disease noted on angiogram 7/2022.  She is on  aspirin and statin.  She is on a beta-blocker.    Mixed hyperlipidemia  She is on a statin    No orders of the defined types were placed in this encounter.    There are no discontinued medications.    No follow-ups on file.    Rubin Patel MD  [facility]   [facility]    This note was created by Dragon voice recognition. Errors in content may be related to improper recognition by the system; efforts to review and correct have been done but errors may still exist.  Plan of care discussed with the patient.  All questions were answered.  Patient verbalized understanding and agrees with the plan.    A letter has been sent to referring physician.

## 2024-04-22 ENCOUNTER — APPOINTMENT (OUTPATIENT)
Dept: COLORECTAL SURGERY | Facility: CLINIC | Age: 44
End: 2024-04-22
Payer: MEDICAID

## 2024-04-22 VITALS — BODY MASS INDEX: 19.25 KG/M2 | WEIGHT: 127 LBS | HEIGHT: 68 IN

## 2024-04-22 DIAGNOSIS — D12.6 BENIGN NEOPLASM OF COLON, UNSPECIFIED: ICD-10-CM

## 2024-04-22 PROCEDURE — 99205 OFFICE O/P NEW HI 60 MIN: CPT

## 2024-04-22 NOTE — HISTORY OF PRESENT ILLNESS
[FreeTextEntry1] : 43 year old male presents to discuss recent colonoscopy findings. He underwent an initial screening colonoscopy on 2/28/24 and polyp was found at the lumen of the appendix. Biopsies were taken and pathology consistent with TA. Patient reports some vague abdominal pain, no other symptoms reported. Weight is stable.   Patient denies family history of colon/rectal cancer, IBD.

## 2024-04-22 NOTE — PHYSICAL EXAM
[Normal Breath Sounds] : Normal breath sounds [Normal Heart Sounds] : normal heart sounds [Normal Rate and Rhythm] : normal rate and rhythm [No Rash or Lesion] : No rash or lesion [Alert] : alert [Oriented to Person] : oriented to person [Oriented to Place] : oriented to place [Oriented to Time] : oriented to time [Calm] : calm [Exam Deferred] : exam was deferred [Abdomen Masses] : No abdominal masses [Abdomen Tenderness] : ~T No ~M abdominal tenderness [Tender] : nontender [de-identified] : soft, NT/ND, +BS [de-identified] : well nourished male [de-identified] : NC/AT [de-identified] : NOY/+ROM [de-identified] : intact

## 2024-04-22 NOTE — REVIEW OF SYSTEMS
[As Noted in HPI] : as noted in HPI [Depression] : depression [Negative] : Heme/Lymph [FreeTextEntry3] : wears glasses [FreeTextEntry6] : asthma [FreeTextEntry9] : back pain, fibromyalgia

## 2024-04-26 ENCOUNTER — OUTPATIENT (OUTPATIENT)
Dept: OUTPATIENT SERVICES | Facility: HOSPITAL | Age: 44
LOS: 1 days | End: 2024-04-26
Payer: MEDICAID

## 2024-04-26 VITALS
RESPIRATION RATE: 12 BRPM | DIASTOLIC BLOOD PRESSURE: 82 MMHG | OXYGEN SATURATION: 100 % | SYSTOLIC BLOOD PRESSURE: 133 MMHG | WEIGHT: 121.03 LBS | HEIGHT: 68 IN | HEART RATE: 82 BPM | TEMPERATURE: 98 F

## 2024-04-26 DIAGNOSIS — Z01.818 ENCOUNTER FOR OTHER PREPROCEDURAL EXAMINATION: ICD-10-CM

## 2024-04-26 DIAGNOSIS — Z98.890 OTHER SPECIFIED POSTPROCEDURAL STATES: Chronic | ICD-10-CM

## 2024-04-26 DIAGNOSIS — K35.80 UNSPECIFIED ACUTE APPENDICITIS: ICD-10-CM

## 2024-04-26 DIAGNOSIS — Z93.8 OTHER ARTIFICIAL OPENING STATUS: Chronic | ICD-10-CM

## 2024-04-26 LAB
ALBUMIN SERPL ELPH-MCNC: 4.6 G/DL — SIGNIFICANT CHANGE UP (ref 3.3–5)
ALP SERPL-CCNC: 79 U/L — SIGNIFICANT CHANGE UP (ref 40–120)
ALT FLD-CCNC: 14 U/L — SIGNIFICANT CHANGE UP (ref 10–45)
ANION GAP SERPL CALC-SCNC: 11 MMOL/L — SIGNIFICANT CHANGE UP (ref 5–17)
AST SERPL-CCNC: 20 U/L — SIGNIFICANT CHANGE UP (ref 10–40)
BILIRUB SERPL-MCNC: 0.3 MG/DL — SIGNIFICANT CHANGE UP (ref 0.2–1.2)
BUN SERPL-MCNC: 13 MG/DL — SIGNIFICANT CHANGE UP (ref 7–23)
CALCIUM SERPL-MCNC: 9.8 MG/DL — SIGNIFICANT CHANGE UP (ref 8.4–10.5)
CHLORIDE SERPL-SCNC: 103 MMOL/L — SIGNIFICANT CHANGE UP (ref 96–108)
CO2 SERPL-SCNC: 25 MMOL/L — SIGNIFICANT CHANGE UP (ref 22–31)
CREAT SERPL-MCNC: 1.12 MG/DL — SIGNIFICANT CHANGE UP (ref 0.5–1.3)
EGFR: 83 ML/MIN/1.73M2 — SIGNIFICANT CHANGE UP
GLUCOSE SERPL-MCNC: 93 MG/DL — SIGNIFICANT CHANGE UP (ref 70–99)
HCT VFR BLD CALC: 42.7 % — SIGNIFICANT CHANGE UP (ref 39–50)
HGB BLD-MCNC: 13.7 G/DL — SIGNIFICANT CHANGE UP (ref 13–17)
MCHC RBC-ENTMCNC: 31.4 PG — SIGNIFICANT CHANGE UP (ref 27–34)
MCHC RBC-ENTMCNC: 32.1 GM/DL — SIGNIFICANT CHANGE UP (ref 32–36)
MCV RBC AUTO: 97.9 FL — SIGNIFICANT CHANGE UP (ref 80–100)
NRBC # BLD: 0 /100 WBCS — SIGNIFICANT CHANGE UP (ref 0–0)
PLATELET # BLD AUTO: 283 K/UL — SIGNIFICANT CHANGE UP (ref 150–400)
POTASSIUM SERPL-MCNC: 4.5 MMOL/L — SIGNIFICANT CHANGE UP (ref 3.5–5.3)
POTASSIUM SERPL-SCNC: 4.5 MMOL/L — SIGNIFICANT CHANGE UP (ref 3.5–5.3)
PROT SERPL-MCNC: 7.8 G/DL — SIGNIFICANT CHANGE UP (ref 6–8.3)
RBC # BLD: 4.36 M/UL — SIGNIFICANT CHANGE UP (ref 4.2–5.8)
RBC # FLD: 11.9 % — SIGNIFICANT CHANGE UP (ref 10.3–14.5)
SODIUM SERPL-SCNC: 139 MMOL/L — SIGNIFICANT CHANGE UP (ref 135–145)
WBC # BLD: 6.64 K/UL — SIGNIFICANT CHANGE UP (ref 3.8–10.5)
WBC # FLD AUTO: 6.64 K/UL — SIGNIFICANT CHANGE UP (ref 3.8–10.5)

## 2024-04-26 PROCEDURE — 85027 COMPLETE CBC AUTOMATED: CPT

## 2024-04-26 PROCEDURE — 80053 COMPREHEN METABOLIC PANEL: CPT

## 2024-04-26 PROCEDURE — G0463: CPT

## 2024-04-26 RX ORDER — LIDOCAINE HCL 20 MG/ML
0.2 VIAL (ML) INJECTION ONCE
Refills: 0 | Status: DISCONTINUED | OUTPATIENT
Start: 2024-05-10 | End: 2024-05-24

## 2024-04-26 RX ORDER — CEFOTETAN DISODIUM 1 G
2 VIAL (EA) INJECTION ONCE
Refills: 0 | Status: DISCONTINUED | OUTPATIENT
Start: 2024-05-10 | End: 2024-05-24

## 2024-04-26 RX ORDER — CHLORHEXIDINE GLUCONATE 213 G/1000ML
1 SOLUTION TOPICAL ONCE
Refills: 0 | Status: DISCONTINUED | OUTPATIENT
Start: 2024-05-10 | End: 2024-05-24

## 2024-04-26 RX ORDER — SODIUM CHLORIDE 9 MG/ML
3 INJECTION INTRAMUSCULAR; INTRAVENOUS; SUBCUTANEOUS EVERY 8 HOURS
Refills: 0 | Status: DISCONTINUED | OUTPATIENT
Start: 2024-05-10 | End: 2024-05-24

## 2024-04-26 RX ORDER — SODIUM CHLORIDE 9 MG/ML
1000 INJECTION, SOLUTION INTRAVENOUS
Refills: 0 | Status: DISCONTINUED | OUTPATIENT
Start: 2024-05-10 | End: 2024-05-24

## 2024-04-26 NOTE — H&P PST ADULT - ASSESSMENT
Activity: Patient states that he walks every day for 20-30 minutes and he has no symptoms.     DASI: 8.97    Mallampati: 3    Dental: Denies loose teeth or dentures.

## 2024-04-26 NOTE — H&P PST ADULT - HISTORY OF PRESENT ILLNESS
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 Patient states that he underwent an initial screening colonoscopy in 2/2024 at which time an adenomatous polyp was found at the appendiceal orifice. He presents today to PST prior to scheduled Laparoscopic Appendectomy on 5/10/24. Patient denies recent fever, chills, chest pain, SOB, palpitations, or recent exposure to COVID-19.

## 2024-04-26 NOTE — H&P PST ADULT - PROBLEM SELECTOR PLAN 1
Laparoscopic Appendectomy on 5/10/24.  CBC and CMP done today at [facility].  Pre op instructions, including chlorhexidine, provided and all questions answered.    Patient with GDPD deficiency as per rheumatology note from 3/2023. Patient denies this, though he was somewhat of a poor historian, and states that he was never told that he had G6PD deficiency, and he states that he was never told to see hematologist. He saw his PCP on 4/17/24 for clearance, will need to obtain PCP note.

## 2024-04-26 NOTE — H&P PST ADULT - NSICDXPASTMEDICALHX_GEN_ALL_CORE_FT
PAST MEDICAL HISTORY:  Anxiety and depression     Asthma unable to take meds    Chronic back pain     Frequency of urination     G6PD deficiency     H/O fibromyalgia     Lumbar spondylosis     Migraine     Phimosis/adherent prepuce     Pneumothorax     Spontaneous pneumothorax     Urgency of urination

## 2024-04-26 NOTE — H&P PST ADULT - NSICDXPASTSURGICALHX_GEN_ALL_CORE_FT
PAST SURGICAL HISTORY:  H/O chest tube placement Left side 2014    H/O circumcision     S/P thoracostomy tube placement

## 2024-04-26 NOTE — H&P PST ADULT - HEMATOLOGY/LYMPHATICS COMMENTS
? G6PD deficiency- will obtain clearance note from PCP, patient denies and states he has never seen hematology

## 2024-05-09 ENCOUNTER — TRANSCRIPTION ENCOUNTER (OUTPATIENT)
Age: 44
End: 2024-05-09

## 2024-05-10 ENCOUNTER — TRANSCRIPTION ENCOUNTER (OUTPATIENT)
Age: 44
End: 2024-05-10

## 2024-05-10 ENCOUNTER — APPOINTMENT (OUTPATIENT)
Dept: COLORECTAL SURGERY | Facility: HOSPITAL | Age: 44
End: 2024-05-10

## 2024-05-10 ENCOUNTER — RESULT REVIEW (OUTPATIENT)
Age: 44
End: 2024-05-10

## 2024-05-10 ENCOUNTER — OUTPATIENT (OUTPATIENT)
Dept: OUTPATIENT SERVICES | Facility: HOSPITAL | Age: 44
LOS: 1 days | End: 2024-05-10
Payer: MEDICAID

## 2024-05-10 VITALS
OXYGEN SATURATION: 98 % | HEIGHT: 68 IN | SYSTOLIC BLOOD PRESSURE: 104 MMHG | DIASTOLIC BLOOD PRESSURE: 68 MMHG | RESPIRATION RATE: 16 BRPM | HEART RATE: 74 BPM | WEIGHT: 121.03 LBS | TEMPERATURE: 97 F

## 2024-05-10 VITALS
TEMPERATURE: 98 F | SYSTOLIC BLOOD PRESSURE: 106 MMHG | RESPIRATION RATE: 16 BRPM | DIASTOLIC BLOOD PRESSURE: 59 MMHG | HEART RATE: 68 BPM | OXYGEN SATURATION: 100 %

## 2024-05-10 DIAGNOSIS — Z98.890 OTHER SPECIFIED POSTPROCEDURAL STATES: Chronic | ICD-10-CM

## 2024-05-10 DIAGNOSIS — Z93.8 OTHER ARTIFICIAL OPENING STATUS: Chronic | ICD-10-CM

## 2024-05-10 DIAGNOSIS — K35.80 UNSPECIFIED ACUTE APPENDICITIS: ICD-10-CM

## 2024-05-10 PROCEDURE — 88304 TISSUE EXAM BY PATHOLOGIST: CPT

## 2024-05-10 PROCEDURE — C9399: CPT

## 2024-05-10 PROCEDURE — C1889: CPT

## 2024-05-10 PROCEDURE — 88304 TISSUE EXAM BY PATHOLOGIST: CPT | Mod: 26

## 2024-05-10 PROCEDURE — 44970 LAPAROSCOPY APPENDECTOMY: CPT | Mod: 22

## 2024-05-10 PROCEDURE — 44970 LAPAROSCOPY APPENDECTOMY: CPT

## 2024-05-10 DEVICE — CLIP APPLIER COVIDIEN ENDOCLIP III 5MM: Type: IMPLANTABLE DEVICE | Status: FUNCTIONAL

## 2024-05-10 DEVICE — STAPLER COVIDIEN TRI-STAPLE 60MM PURPLE RELOAD: Type: IMPLANTABLE DEVICE | Status: FUNCTIONAL

## 2024-05-10 RX ORDER — ONDANSETRON 8 MG/1
4 TABLET, FILM COATED ORAL ONCE
Refills: 0 | Status: DISCONTINUED | OUTPATIENT
Start: 2024-05-10 | End: 2024-05-24

## 2024-05-10 RX ORDER — CYCLOBENZAPRINE HYDROCHLORIDE 10 MG/1
1 TABLET, FILM COATED ORAL
Refills: 0 | DISCHARGE

## 2024-05-10 RX ORDER — OXYCODONE HYDROCHLORIDE 5 MG/1
1 TABLET ORAL
Refills: 0 | DISCHARGE

## 2024-05-10 RX ORDER — SODIUM CHLORIDE 9 MG/ML
1000 INJECTION, SOLUTION INTRAVENOUS
Refills: 0 | Status: DISCONTINUED | OUTPATIENT
Start: 2024-05-10 | End: 2024-05-24

## 2024-05-10 RX ORDER — TERBINAFINE HCL 250 MG
1 TABLET ORAL
Refills: 0 | DISCHARGE

## 2024-05-10 RX ORDER — GABAPENTIN 400 MG/1
1 CAPSULE ORAL
Refills: 0 | DISCHARGE

## 2024-05-10 RX ORDER — IPRATROPIUM/ALBUTEROL SULFATE 18-103MCG
3 AEROSOL WITH ADAPTER (GRAM) INHALATION
Refills: 0 | DISCHARGE

## 2024-05-10 RX ORDER — DULOXETINE HYDROCHLORIDE 30 MG/1
1 CAPSULE, DELAYED RELEASE ORAL
Refills: 0 | DISCHARGE

## 2024-05-10 RX ORDER — KETOCONAZOLE 20 MG/G
1 AEROSOL, FOAM TOPICAL
Refills: 0 | DISCHARGE

## 2024-05-10 RX ORDER — LORATADINE 10 MG/1
1 TABLET ORAL
Qty: 0 | Refills: 0 | DISCHARGE

## 2024-05-10 RX ORDER — AZELASTINE HCL 0.05 %
2 DROPS OPHTHALMIC (EYE)
Refills: 0 | DISCHARGE

## 2024-05-10 RX ORDER — OXYCODONE HYDROCHLORIDE 5 MG/1
1 TABLET ORAL
Qty: 12 | Refills: 0
Start: 2024-05-10 | End: 2024-05-12

## 2024-05-10 RX ORDER — HYDROMORPHONE HYDROCHLORIDE 2 MG/ML
0.5 INJECTION INTRAMUSCULAR; INTRAVENOUS; SUBCUTANEOUS
Refills: 0 | Status: DISCONTINUED | OUTPATIENT
Start: 2024-05-10 | End: 2024-05-10

## 2024-05-10 RX ADMIN — HYDROMORPHONE HYDROCHLORIDE 0.5 MILLIGRAM(S): 2 INJECTION INTRAMUSCULAR; INTRAVENOUS; SUBCUTANEOUS at 11:15

## 2024-05-10 RX ADMIN — SODIUM CHLORIDE 100 MILLILITER(S): 9 INJECTION, SOLUTION INTRAVENOUS at 08:27

## 2024-05-10 RX ADMIN — HYDROMORPHONE HYDROCHLORIDE 0.5 MILLIGRAM(S): 2 INJECTION INTRAMUSCULAR; INTRAVENOUS; SUBCUTANEOUS at 11:01

## 2024-05-10 NOTE — ASU DISCHARGE PLAN (ADULT/PEDIATRIC) - MEDICATION INSTRUCTIONS
Take tylenol 325 mg every 6 hours. Take ibuprofen 400 mg every 6 hours. Take oxycodone 5 mg every 6 hours for severe pain

## 2024-05-10 NOTE — ASU DISCHARGE PLAN (ADULT/PEDIATRIC) - PLEASE INDICATE TEMPERATURE IN FAHRENHEIT OR CELSIUS
Please notify patient regarding their test results:    Thyroid levels are very surprisingly low. Please clarify with pt exactly how often she takes her 200 mcg of levothyroxine for the past two months (has she skipped any days/weeks?). She was previously doing well close to therapeutic range in Jan and now it is very off target without changing any doses. If she is not taking it consistently, she should resume that dose. If she is, then she needs a higher thyroid dose adjustment. Also if she has been feeling unwell lately, that could also affect her her thyroid levels. Will follow up with plan once pt responds. 100.4

## 2024-05-10 NOTE — ASU DISCHARGE PLAN (ADULT/PEDIATRIC) - CARE PROVIDER_API CALL
Bravo Guan  Colon/Rectal Surgery  [address], [address]  [place] [address]  Phone: [phone]  Fax: [phone]  Established Patient  Follow Up Time: 2 weeks

## 2024-05-10 NOTE — PRE-ANESTHESIA EVALUATION ADULT - NSANTHPMHFT_GEN_ALL_CORE
43M PMH lumbar pain/spondylosis, G6PD deficiency, cervicalgia, anxiety, depression, asthma.  Intermittent RUE shooting pain.

## 2024-05-10 NOTE — ASU DISCHARGE PLAN (ADULT/PEDIATRIC) - PAIN MANAGEMENT
Take over the counter pain medication/Prescriptions electronically submitted to pharmacy from [facility] Next dose of Tylenol will be on or after ___________ ,today/tonight and every 6 hours afterwards for pain management, do not take any Tylenol containing products until this time. Your first dose of Tylenol was given at ___________. Do not exceed more than 4000mg of Tylenol in one 24 hour setting. If no contraindications, you may alternate with Ibuprofen 3 hours after dose of Tylenol. Ibuprofen can be taken every 6 hours due @_________________/Take over the counter pain medication/Prescriptions electronically submitted to pharmacy from [facility]

## 2024-05-16 LAB — SURGICAL PATHOLOGY STUDY: SIGNIFICANT CHANGE UP
